# Patient Record
Sex: MALE | Race: BLACK OR AFRICAN AMERICAN | Employment: UNEMPLOYED | URBAN - METROPOLITAN AREA
[De-identification: names, ages, dates, MRNs, and addresses within clinical notes are randomized per-mention and may not be internally consistent; named-entity substitution may affect disease eponyms.]

---
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
## 2020-06-06 ENCOUNTER — HOSPITAL ENCOUNTER (INPATIENT)
Age: 20
LOS: 1 days | Discharge: HOME | DRG: 909 | End: 2020-06-07
Attending: EMERGENCY MEDICINE | Admitting: PODIATRIST

## 2020-06-06 ENCOUNTER — APPOINTMENT (OUTPATIENT)
Dept: GENERAL RADIOLOGY | Age: 20
DRG: 909 | End: 2020-06-06

## 2020-06-06 LAB
ALLEN TEST: ABNORMAL
ANION GAP SERPL CALCULATED.3IONS-SCNC: 14 MMOL/L (ref 9–17)
BLOOD BANK SPECIMEN: ABNORMAL
BUN BLDV-MCNC: 7 MG/DL (ref 6–20)
CARBOXYHEMOGLOBIN: ABNORMAL %
CHLORIDE BLD-SCNC: 101 MMOL/L (ref 98–107)
CO2: 22 MMOL/L (ref 20–31)
CREAT SERPL-MCNC: 0.82 MG/DL (ref 0.7–1.2)
ETHANOL PERCENT: 0.05 %
ETHANOL: 51 MG/DL
FIO2: ABNORMAL
GFR AFRICAN AMERICAN: ABNORMAL ML/MIN
GFR NON-AFRICAN AMERICAN: ABNORMAL ML/MIN
GFR SERPL CREATININE-BSD FRML MDRD: ABNORMAL ML/MIN/{1.73_M2}
GFR SERPL CREATININE-BSD FRML MDRD: ABNORMAL ML/MIN/{1.73_M2}
GLUCOSE BLD-MCNC: 96 MG/DL (ref 70–99)
HCG QUALITATIVE: NEGATIVE
HCO3 VENOUS: ABNORMAL MMOL/L (ref 24–30)
HCT VFR BLD CALC: 48.8 % (ref 40.7–50.3)
HEMOGLOBIN: 15.3 G/DL (ref 13–17)
INR BLD: 1
MCH RBC QN AUTO: 27.1 PG (ref 25.2–33.5)
MCHC RBC AUTO-ENTMCNC: 31.4 G/DL (ref 28.4–34.8)
MCV RBC AUTO: 86.4 FL (ref 82.6–102.9)
METHEMOGLOBIN: ABNORMAL %
MODE: ABNORMAL
NEGATIVE BASE EXCESS, VEN: ABNORMAL MMOL/L (ref 0–2)
NOTIFICATION TIME: ABNORMAL
NOTIFICATION: ABNORMAL
NRBC AUTOMATED: 0 PER 100 WBC
O2 DEVICE/FLOW/%: ABNORMAL
O2 SAT, VEN: ABNORMAL %
OXYHEMOGLOBIN: ABNORMAL % (ref 95–98)
PARTIAL THROMBOPLASTIN TIME: 26.5 SEC (ref 20.5–30.5)
PATIENT TEMP: ABNORMAL
PCO2, VEN, TEMP ADJ: ABNORMAL MMHG (ref 39–55)
PCO2, VEN: ABNORMAL (ref 39–55)
PDW BLD-RTO: 13.5 % (ref 11.8–14.4)
PEEP/CPAP: ABNORMAL
PH VENOUS: ABNORMAL (ref 7.32–7.42)
PH, VEN, TEMP ADJ: ABNORMAL (ref 7.32–7.42)
PLATELET # BLD: 263 K/UL (ref 138–453)
PMV BLD AUTO: 9.3 FL (ref 8.1–13.5)
PO2, VEN, TEMP ADJ: ABNORMAL MMHG (ref 30–50)
PO2, VEN: ABNORMAL (ref 30–50)
POSITIVE BASE EXCESS, VEN: ABNORMAL MMOL/L (ref 0–2)
POTASSIUM SERPL-SCNC: 4 MMOL/L (ref 3.7–5.3)
PROTHROMBIN TIME: 10.3 SEC (ref 9–12)
PSV: ABNORMAL
PT. POSITION: ABNORMAL
RBC # BLD: 5.65 M/UL (ref 4.21–5.77)
RESPIRATORY RATE: ABNORMAL
SAMPLE SITE: ABNORMAL
SET RATE: ABNORMAL
SODIUM BLD-SCNC: 137 MMOL/L (ref 135–144)
TEXT FOR RESPIRATORY: ABNORMAL
TOTAL HB: ABNORMAL G/DL (ref 12–16)
TOTAL RATE: ABNORMAL
VT: ABNORMAL
WBC # BLD: 9.4 K/UL (ref 4.5–13.5)

## 2020-06-06 PROCEDURE — 82805 BLOOD GASES W/O2 SATURATION: CPT

## 2020-06-06 PROCEDURE — 80051 ELECTROLYTE PANEL: CPT

## 2020-06-06 PROCEDURE — G0480 DRUG TEST DEF 1-7 CLASSES: HCPCS

## 2020-06-06 PROCEDURE — 99285 EMERGENCY DEPT VISIT HI MDM: CPT

## 2020-06-06 PROCEDURE — 73610 X-RAY EXAM OF ANKLE: CPT

## 2020-06-06 PROCEDURE — 84703 CHORIONIC GONADOTROPIN ASSAY: CPT

## 2020-06-06 PROCEDURE — 2580000003 HC RX 258: Performed by: STUDENT IN AN ORGANIZED HEALTH CARE EDUCATION/TRAINING PROGRAM

## 2020-06-06 PROCEDURE — 84520 ASSAY OF UREA NITROGEN: CPT

## 2020-06-06 PROCEDURE — 85730 THROMBOPLASTIN TIME PARTIAL: CPT

## 2020-06-06 PROCEDURE — 82947 ASSAY GLUCOSE BLOOD QUANT: CPT

## 2020-06-06 PROCEDURE — 6360000002 HC RX W HCPCS: Performed by: STUDENT IN AN ORGANIZED HEALTH CARE EDUCATION/TRAINING PROGRAM

## 2020-06-06 PROCEDURE — 82565 ASSAY OF CREATININE: CPT

## 2020-06-06 PROCEDURE — 85610 PROTHROMBIN TIME: CPT

## 2020-06-06 PROCEDURE — 85027 COMPLETE CBC AUTOMATED: CPT

## 2020-06-06 PROCEDURE — 73630 X-RAY EXAM OF FOOT: CPT

## 2020-06-06 RX ORDER — ONDANSETRON 2 MG/ML
4 INJECTION INTRAMUSCULAR; INTRAVENOUS ONCE
Status: COMPLETED | OUTPATIENT
Start: 2020-06-06 | End: 2020-06-06

## 2020-06-06 RX ORDER — MORPHINE SULFATE 4 MG/ML
4 INJECTION, SOLUTION INTRAMUSCULAR; INTRAVENOUS ONCE
Status: COMPLETED | OUTPATIENT
Start: 2020-06-06 | End: 2020-06-06

## 2020-06-06 RX ADMIN — MORPHINE SULFATE 4 MG: 4 INJECTION INTRAVENOUS at 22:09

## 2020-06-06 RX ADMIN — DEXTROSE MONOHYDRATE 2 G: 50 INJECTION, SOLUTION INTRAVENOUS at 22:54

## 2020-06-06 RX ADMIN — ONDANSETRON 4 MG: 2 INJECTION INTRAMUSCULAR; INTRAVENOUS at 22:40

## 2020-06-06 RX ADMIN — MORPHINE SULFATE 4 MG: 4 INJECTION INTRAVENOUS at 22:40

## 2020-06-06 ASSESSMENT — PAIN DESCRIPTION - PAIN TYPE: TYPE: ACUTE PAIN

## 2020-06-06 ASSESSMENT — PAIN DESCRIPTION - LOCATION: LOCATION: FOOT

## 2020-06-06 ASSESSMENT — PAIN DESCRIPTION - FREQUENCY: FREQUENCY: CONTINUOUS

## 2020-06-06 ASSESSMENT — PAIN DESCRIPTION - ORIENTATION: ORIENTATION: LEFT

## 2020-06-06 ASSESSMENT — PAIN DESCRIPTION - DESCRIPTORS: DESCRIPTORS: NUMBNESS

## 2020-06-06 ASSESSMENT — PAIN SCALES - GENERAL: PAINLEVEL_OUTOF10: 10

## 2020-06-07 ENCOUNTER — APPOINTMENT (OUTPATIENT)
Dept: CT IMAGING | Age: 20
DRG: 909 | End: 2020-06-07

## 2020-06-07 ENCOUNTER — APPOINTMENT (OUTPATIENT)
Dept: GENERAL RADIOLOGY | Age: 20
DRG: 909 | End: 2020-06-07

## 2020-06-07 ENCOUNTER — ANESTHESIA EVENT (OUTPATIENT)
Dept: OPERATING ROOM | Age: 20
DRG: 909 | End: 2020-06-07

## 2020-06-07 ENCOUNTER — ANESTHESIA (OUTPATIENT)
Dept: OPERATING ROOM | Age: 20
DRG: 909 | End: 2020-06-07

## 2020-06-07 VITALS
SYSTOLIC BLOOD PRESSURE: 102 MMHG | RESPIRATION RATE: 16 BRPM | HEART RATE: 46 BPM | WEIGHT: 160 LBS | DIASTOLIC BLOOD PRESSURE: 56 MMHG | OXYGEN SATURATION: 100 % | TEMPERATURE: 96.8 F

## 2020-06-07 VITALS — TEMPERATURE: 94.9 F | DIASTOLIC BLOOD PRESSURE: 59 MMHG | OXYGEN SATURATION: 100 % | SYSTOLIC BLOOD PRESSURE: 93 MMHG
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
## 2020-06-07 PROBLEM — S91.331A: Status: ACTIVE | Noted: 2020-06-07

## 2020-06-07 LAB
SARS-COV-2, PCR: NORMAL
SARS-COV-2, PCR: NORMAL
SARS-COV-2, RAPID: NORMAL
SARS-COV-2, RAPID: NOT DETECTED
SARS-COV-2: NORMAL
SARS-COV-2: NOT DETECTED
SOURCE: NORMAL
SOURCE: NORMAL

## 2020-06-07 PROCEDURE — 0JCR0ZZ EXTIRPATION OF MATTER FROM LEFT FOOT SUBCUTANEOUS TISSUE AND FASCIA, OPEN APPROACH: ICD-10-PCS | Performed by: PODIATRIST

## 2020-06-07 PROCEDURE — 6360000002 HC RX W HCPCS: Performed by: STUDENT IN AN ORGANIZED HEALTH CARE EDUCATION/TRAINING PROGRAM

## 2020-06-07 PROCEDURE — 73630 X-RAY EXAM OF FOOT: CPT

## 2020-06-07 PROCEDURE — 90471 IMMUNIZATION ADMIN: CPT | Performed by: STUDENT IN AN ORGANIZED HEALTH CARE EDUCATION/TRAINING PROGRAM

## 2020-06-07 PROCEDURE — 2580000003 HC RX 258: Performed by: NURSE ANESTHETIST, CERTIFIED REGISTERED

## 2020-06-07 PROCEDURE — 90715 TDAP VACCINE 7 YRS/> IM: CPT | Performed by: STUDENT IN AN ORGANIZED HEALTH CARE EDUCATION/TRAINING PROGRAM

## 2020-06-07 PROCEDURE — 73700 CT LOWER EXTREMITY W/O DYE: CPT

## 2020-06-07 PROCEDURE — 3700000000 HC ANESTHESIA ATTENDED CARE: Performed by: PODIATRIST

## 2020-06-07 PROCEDURE — 3600000004 HC SURGERY LEVEL 4 BASE: Performed by: PODIATRIST

## 2020-06-07 PROCEDURE — 28193 REMOVAL OF FOOT FOREIGN BODY: CPT | Performed by: PODIATRIST

## 2020-06-07 PROCEDURE — 2580000003 HC RX 258: Performed by: STUDENT IN AN ORGANIZED HEALTH CARE EDUCATION/TRAINING PROGRAM

## 2020-06-07 PROCEDURE — 7100000011 HC PHASE II RECOVERY - ADDTL 15 MIN: Performed by: PODIATRIST

## 2020-06-07 PROCEDURE — U0003 INFECTIOUS AGENT DETECTION BY NUCLEIC ACID (DNA OR RNA); SEVERE ACUTE RESPIRATORY SYNDROME CORONAVIRUS 2 (SARS-COV-2) (CORONAVIRUS DISEASE [COVID-19]), AMPLIFIED PROBE TECHNIQUE, MAKING USE OF HIGH THROUGHPUT TECHNOLOGIES AS DESCRIBED BY CMS-2020-01-R: HCPCS

## 2020-06-07 PROCEDURE — 3700000001 HC ADD 15 MINUTES (ANESTHESIA): Performed by: PODIATRIST

## 2020-06-07 PROCEDURE — 2500000003 HC RX 250 WO HCPCS: Performed by: NURSE ANESTHETIST, CERTIFIED REGISTERED

## 2020-06-07 PROCEDURE — 2500000003 HC RX 250 WO HCPCS: Performed by: PODIATRIST

## 2020-06-07 PROCEDURE — 73620 X-RAY EXAM OF FOOT: CPT

## 2020-06-07 PROCEDURE — 7100000010 HC PHASE II RECOVERY - FIRST 15 MIN: Performed by: PODIATRIST

## 2020-06-07 PROCEDURE — 6370000000 HC RX 637 (ALT 250 FOR IP): Performed by: STUDENT IN AN ORGANIZED HEALTH CARE EDUCATION/TRAINING PROGRAM

## 2020-06-07 PROCEDURE — 3600000014 HC SURGERY LEVEL 4 ADDTL 15MIN: Performed by: PODIATRIST

## 2020-06-07 PROCEDURE — 0J9R0ZZ DRAINAGE OF LEFT FOOT SUBCUTANEOUS TISSUE AND FASCIA, OPEN APPROACH: ICD-10-PCS | Performed by: PODIATRIST

## 2020-06-07 PROCEDURE — U0002 COVID-19 LAB TEST NON-CDC: HCPCS

## 2020-06-07 PROCEDURE — 6360000002 HC RX W HCPCS: Performed by: NURSE ANESTHETIST, CERTIFIED REGISTERED

## 2020-06-07 PROCEDURE — 2709999900 HC NON-CHARGEABLE SUPPLY: Performed by: PODIATRIST

## 2020-06-07 PROCEDURE — 1200000000 HC SEMI PRIVATE

## 2020-06-07 RX ORDER — ALBUTEROL SULFATE 90 UG/1
2 AEROSOL, METERED RESPIRATORY (INHALATION) EVERY 6 HOURS PRN
Qty: 1 INHALER | Refills: 3 | Status: CANCELLED | OUTPATIENT
Start: 2020-06-08

## 2020-06-07 RX ORDER — PROPOFOL 10 MG/ML
INJECTION, EMULSION INTRAVENOUS PRN
Status: DISCONTINUED | OUTPATIENT
Start: 2020-06-07 | End: 2020-06-07 | Stop reason: SDUPTHER

## 2020-06-07 RX ORDER — MONTELUKAST SODIUM 10 MG/1
10 TABLET ORAL NIGHTLY
Qty: 30 TABLET | Refills: 3 | Status: CANCELLED | OUTPATIENT
Start: 2020-06-07

## 2020-06-07 RX ORDER — OXYCODONE HYDROCHLORIDE AND ACETAMINOPHEN 5; 325 MG/1; MG/1
1 TABLET ORAL EVERY 6 HOURS PRN
Qty: 28 TABLET | Refills: 0 | Status: SHIPPED | OUTPATIENT
Start: 2020-06-07 | End: 2020-06-14

## 2020-06-07 RX ORDER — POLYETHYLENE GLYCOL 3350 17 G/17G
17 POWDER, FOR SOLUTION ORAL DAILY PRN
Status: DISCONTINUED | OUTPATIENT
Start: 2020-06-07 | End: 2020-06-07 | Stop reason: HOSPADM

## 2020-06-07 RX ORDER — SODIUM CHLORIDE 0.9 % (FLUSH) 0.9 %
10 SYRINGE (ML) INJECTION EVERY 12 HOURS SCHEDULED
Status: DISCONTINUED | OUTPATIENT
Start: 2020-06-07 | End: 2020-06-07 | Stop reason: HOSPADM

## 2020-06-07 RX ORDER — OXYCODONE HYDROCHLORIDE AND ACETAMINOPHEN 5; 325 MG/1; MG/1
1 TABLET ORAL EVERY 4 HOURS PRN
Status: DISCONTINUED | OUTPATIENT
Start: 2020-06-07 | End: 2020-06-07 | Stop reason: HOSPADM

## 2020-06-07 RX ORDER — CIPROFLOXACIN 500 MG/1
500 TABLET, FILM COATED ORAL 2 TIMES DAILY
Qty: 14 TABLET | Refills: 0 | Status: SHIPPED | OUTPATIENT
Start: 2020-06-07 | End: 2020-06-07 | Stop reason: SDUPTHER

## 2020-06-07 RX ORDER — SODIUM CHLORIDE 0.9 % (FLUSH) 0.9 %
10 SYRINGE (ML) INJECTION PRN
Status: DISCONTINUED | OUTPATIENT
Start: 2020-06-07 | End: 2020-06-07 | Stop reason: HOSPADM

## 2020-06-07 RX ORDER — MORPHINE SULFATE 2 MG/ML
2 INJECTION, SOLUTION INTRAMUSCULAR; INTRAVENOUS
Status: DISCONTINUED | OUTPATIENT
Start: 2020-06-07 | End: 2020-06-07 | Stop reason: HOSPADM

## 2020-06-07 RX ORDER — SODIUM CHLORIDE 0.9 % (FLUSH) 0.9 %
10 SYRINGE (ML) INJECTION PRN
Status: CANCELLED | OUTPATIENT
Start: 2020-06-07

## 2020-06-07 RX ORDER — FENTANYL CITRATE 50 UG/ML
INJECTION, SOLUTION INTRAMUSCULAR; INTRAVENOUS PRN
Status: DISCONTINUED | OUTPATIENT
Start: 2020-06-07 | End: 2020-06-07 | Stop reason: SDUPTHER

## 2020-06-07 RX ORDER — OXYCODONE HYDROCHLORIDE AND ACETAMINOPHEN 5; 325 MG/1; MG/1
2 TABLET ORAL EVERY 4 HOURS PRN
Status: DISCONTINUED | OUTPATIENT
Start: 2020-06-07 | End: 2020-06-07 | Stop reason: HOSPADM

## 2020-06-07 RX ORDER — PROMETHAZINE HYDROCHLORIDE 25 MG/1
12.5 TABLET ORAL EVERY 6 HOURS PRN
Status: DISCONTINUED | OUTPATIENT
Start: 2020-06-07 | End: 2020-06-07 | Stop reason: HOSPADM

## 2020-06-07 RX ORDER — CIPROFLOXACIN 500 MG/1
500 TABLET, FILM COATED ORAL 2 TIMES DAILY
Qty: 14 TABLET | Refills: 0 | Status: SHIPPED | OUTPATIENT
Start: 2020-06-07 | End: 2020-06-14

## 2020-06-07 RX ORDER — ACETAMINOPHEN 325 MG/1
650 TABLET ORAL EVERY 6 HOURS PRN
Status: DISCONTINUED | OUTPATIENT
Start: 2020-06-07 | End: 2020-06-07 | Stop reason: HOSPADM

## 2020-06-07 RX ORDER — POLYETHYLENE GLYCOL 3350 17 G/17G
17 POWDER, FOR SOLUTION ORAL DAILY PRN
Status: CANCELLED | OUTPATIENT
Start: 2020-06-07

## 2020-06-07 RX ORDER — ALBUTEROL SULFATE 90 UG/1
2 AEROSOL, METERED RESPIRATORY (INHALATION) EVERY 6 HOURS PRN
Status: DISCONTINUED | OUTPATIENT
Start: 2020-06-08 | End: 2020-06-07 | Stop reason: HOSPADM

## 2020-06-07 RX ORDER — ONDANSETRON 2 MG/ML
4 INJECTION INTRAMUSCULAR; INTRAVENOUS EVERY 6 HOURS PRN
Status: CANCELLED | OUTPATIENT
Start: 2020-06-07

## 2020-06-07 RX ORDER — MORPHINE SULFATE 4 MG/ML
4 INJECTION, SOLUTION INTRAMUSCULAR; INTRAVENOUS
Status: DISCONTINUED | OUTPATIENT
Start: 2020-06-07 | End: 2020-06-07 | Stop reason: HOSPADM

## 2020-06-07 RX ORDER — MONTELUKAST SODIUM 10 MG/1
10 TABLET ORAL NIGHTLY
Status: DISCONTINUED | OUTPATIENT
Start: 2020-06-07 | End: 2020-06-07 | Stop reason: HOSPADM

## 2020-06-07 RX ORDER — CIPROFLOXACIN 500 MG/1
500 TABLET, FILM COATED ORAL 2 TIMES DAILY
Qty: 14 TABLET | Refills: 0
Start: 2020-06-07 | End: 2020-06-07 | Stop reason: SDUPTHER

## 2020-06-07 RX ORDER — NICOTINE 21 MG/24HR
1 PATCH, TRANSDERMAL 24 HOURS TRANSDERMAL DAILY
Status: DISCONTINUED | OUTPATIENT
Start: 2020-06-07 | End: 2020-06-07 | Stop reason: HOSPADM

## 2020-06-07 RX ORDER — PROPOFOL 10 MG/ML
INJECTION, EMULSION INTRAVENOUS CONTINUOUS PRN
Status: DISCONTINUED | OUTPATIENT
Start: 2020-06-07 | End: 2020-06-07 | Stop reason: SDUPTHER

## 2020-06-07 RX ORDER — ACETAMINOPHEN 325 MG/1
650 TABLET ORAL EVERY 6 HOURS PRN
Status: CANCELLED | OUTPATIENT
Start: 2020-06-07

## 2020-06-07 RX ORDER — KETAMINE HYDROCHLORIDE 100 MG/ML
INJECTION, SOLUTION INTRAMUSCULAR; INTRAVENOUS PRN
Status: DISCONTINUED | OUTPATIENT
Start: 2020-06-07 | End: 2020-06-07 | Stop reason: SDUPTHER

## 2020-06-07 RX ORDER — SODIUM CHLORIDE 9 MG/ML
INJECTION, SOLUTION INTRAVENOUS CONTINUOUS PRN
Status: DISCONTINUED | OUTPATIENT
Start: 2020-06-07 | End: 2020-06-07 | Stop reason: SDUPTHER

## 2020-06-07 RX ORDER — LIDOCAINE HYDROCHLORIDE 10 MG/ML
INJECTION, SOLUTION EPIDURAL; INFILTRATION; INTRACAUDAL; PERINEURAL PRN
Status: DISCONTINUED | OUTPATIENT
Start: 2020-06-07 | End: 2020-06-07 | Stop reason: SDUPTHER

## 2020-06-07 RX ORDER — ACETAMINOPHEN 650 MG/1
650 SUPPOSITORY RECTAL EVERY 6 HOURS PRN
Status: CANCELLED | OUTPATIENT
Start: 2020-06-07

## 2020-06-07 RX ORDER — PROMETHAZINE HYDROCHLORIDE 25 MG/1
12.5 TABLET ORAL EVERY 6 HOURS PRN
Status: CANCELLED | OUTPATIENT
Start: 2020-06-07

## 2020-06-07 RX ORDER — ONDANSETRON 2 MG/ML
4 INJECTION INTRAMUSCULAR; INTRAVENOUS EVERY 6 HOURS PRN
Status: DISCONTINUED | OUTPATIENT
Start: 2020-06-07 | End: 2020-06-07 | Stop reason: HOSPADM

## 2020-06-07 RX ORDER — MIDAZOLAM HYDROCHLORIDE 1 MG/ML
INJECTION INTRAMUSCULAR; INTRAVENOUS PRN
Status: DISCONTINUED | OUTPATIENT
Start: 2020-06-07 | End: 2020-06-07 | Stop reason: SDUPTHER

## 2020-06-07 RX ORDER — ONDANSETRON 2 MG/ML
4 INJECTION INTRAMUSCULAR; INTRAVENOUS
Status: COMPLETED | OUTPATIENT
Start: 2020-06-07 | End: 2020-06-07

## 2020-06-07 RX ORDER — ACETAMINOPHEN 650 MG/1
650 SUPPOSITORY RECTAL EVERY 6 HOURS PRN
Status: DISCONTINUED | OUTPATIENT
Start: 2020-06-07 | End: 2020-06-07 | Stop reason: HOSPADM

## 2020-06-07 RX ORDER — SODIUM CHLORIDE 0.9 % (FLUSH) 0.9 %
10 SYRINGE (ML) INJECTION EVERY 12 HOURS SCHEDULED
Status: CANCELLED | OUTPATIENT
Start: 2020-06-07

## 2020-06-07 RX ORDER — MORPHINE SULFATE 4 MG/ML
4 INJECTION, SOLUTION INTRAMUSCULAR; INTRAVENOUS
Status: DISCONTINUED | OUTPATIENT
Start: 2020-06-07 | End: 2020-06-07 | Stop reason: SDUPTHER

## 2020-06-07 RX ADMIN — PIPERACILLIN AND TAZOBACTAM 3.38 G: 3; .375 INJECTION, POWDER, FOR SOLUTION INTRAVENOUS at 00:15

## 2020-06-07 RX ADMIN — LIDOCAINE HYDROCHLORIDE 50 MG: 10 INJECTION, SOLUTION EPIDURAL; INFILTRATION; INTRACAUDAL; PERINEURAL at 11:55

## 2020-06-07 RX ADMIN — ONDANSETRON 4 MG: 2 INJECTION INTRAMUSCULAR; INTRAVENOUS at 02:36

## 2020-06-07 RX ADMIN — OXYCODONE HYDROCHLORIDE AND ACETAMINOPHEN 2 TABLET: 5; 325 TABLET ORAL at 03:36

## 2020-06-07 RX ADMIN — SODIUM CHLORIDE: 9 INJECTION, SOLUTION INTRAVENOUS at 11:47

## 2020-06-07 RX ADMIN — PROPOFOL 50 MG: 10 INJECTION, EMULSION INTRAVENOUS at 11:55

## 2020-06-07 RX ADMIN — KETAMINE HYDROCHLORIDE 45 MG: 100 INJECTION, SOLUTION, CONCENTRATE INTRAMUSCULAR; INTRAVENOUS at 12:32

## 2020-06-07 RX ADMIN — TETANUS TOXOID, REDUCED DIPHTHERIA TOXOID AND ACELLULAR PERTUSSIS VACCINE, ADSORBED 0.5 ML: 5; 2.5; 8; 8; 2.5 SUSPENSION INTRAMUSCULAR at 00:15

## 2020-06-07 RX ADMIN — FENTANYL CITRATE 100 MCG: 50 INJECTION INTRAMUSCULAR; INTRAVENOUS at 11:50

## 2020-06-07 RX ADMIN — PROPOFOL 150 MCG/KG/MIN: 10 INJECTION, EMULSION INTRAVENOUS at 11:54

## 2020-06-07 RX ADMIN — PIPERACILLIN AND TAZOBACTAM 3.38 G: 3; .375 INJECTION, POWDER, FOR SOLUTION INTRAVENOUS at 11:31

## 2020-06-07 RX ADMIN — MORPHINE SULFATE 4 MG: 4 INJECTION INTRAVENOUS at 02:36

## 2020-06-07 RX ADMIN — MIDAZOLAM HYDROCHLORIDE 2 MG: 1 INJECTION, SOLUTION INTRAMUSCULAR; INTRAVENOUS at 11:50

## 2020-06-07 ASSESSMENT — PULMONARY FUNCTION TESTS
PIF_VALUE: 1
PIF_VALUE: 0
PIF_VALUE: 1
PIF_VALUE: 0
PIF_VALUE: 1

## 2020-06-07 ASSESSMENT — PAIN SCALES - GENERAL
PAINLEVEL_OUTOF10: 4
PAINLEVEL_OUTOF10: 7
PAINLEVEL_OUTOF10: 10

## 2020-06-07 ASSESSMENT — ENCOUNTER SYMPTOMS
NAUSEA: 0
CONSTIPATION: 0
DIARRHEA: 0
VOMITING: 0
SHORTNESS OF BREATH: 0
ABDOMINAL PAIN: 0
COLOR CHANGE: 1
NAUSEA: 1
VOMITING: 1

## 2020-06-07 ASSESSMENT — LIFESTYLE VARIABLES: SMOKING_STATUS: 1
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
## 2020-06-07 NOTE — ED NOTES
Antibiotics started per orders, pt crying and moaning in room. Pt remains on monitor, call light within reach.       Henrry Valdes RN  06/06/20 2245
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
## 2020-08-05 ENCOUNTER — APPOINTMENT (OUTPATIENT)
Dept: GENERAL RADIOLOGY | Age: 20
End: 2020-08-05

## 2020-08-05 ENCOUNTER — APPOINTMENT (OUTPATIENT)
Dept: CT IMAGING | Age: 20
End: 2020-08-05

## 2020-08-05 ENCOUNTER — HOSPITAL ENCOUNTER (EMERGENCY)
Age: 20
Discharge: HOME | End: 2020-08-05
Attending: EMERGENCY MEDICINE

## 2020-08-05 PROBLEM — R78.0 ELEVATED ETOH LEVEL: Status: ACTIVE | Noted: 2020-08-05

## 2020-08-05 PROBLEM — S62.201A: Status: ACTIVE | Noted: 2020-08-05

## 2020-08-05 LAB
ABO/RH: NORMAL
ALLEN TEST: ABNORMAL
ANION GAP SERPL CALCULATED.3IONS-SCNC: 19 MMOL/L (ref 9–17)
ANTIBODY SCREEN: NEGATIVE
ARM BAND NUMBER: NORMAL
BLOOD BANK SPECIMEN: ABNORMAL
BUN BLDV-MCNC: 7 MG/DL (ref 6–20)
CARBOXYHEMOGLOBIN: 3.1 % (ref 0–5)
CHLORIDE BLD-SCNC: 103 MMOL/L (ref 98–107)
CO2: 20 MMOL/L (ref 20–31)
CREAT SERPL-MCNC: 0.77 MG/DL (ref 0.7–1.2)
ETHANOL PERCENT: 0.22 %
ETHANOL: 219 MG/DL
EXPIRATION DATE: NORMAL
FIO2: ABNORMAL
GFR AFRICAN AMERICAN: ABNORMAL ML/MIN
GFR NON-AFRICAN AMERICAN: ABNORMAL ML/MIN
GFR SERPL CREATININE-BSD FRML MDRD: ABNORMAL ML/MIN/{1.73_M2}
GFR SERPL CREATININE-BSD FRML MDRD: ABNORMAL ML/MIN/{1.73_M2}
GLUCOSE BLD-MCNC: 98 MG/DL (ref 70–99)
HCG QUALITATIVE: ABNORMAL
HCO3 VENOUS: 25 MMOL/L (ref 24–30)
HCT VFR BLD CALC: 52.7 % (ref 40.7–50.3)
HEMOGLOBIN: 16.5 G/DL (ref 13–17)
INR BLD: 1
MCH RBC QN AUTO: 27.5 PG (ref 25.2–33.5)
MCHC RBC AUTO-ENTMCNC: 31.3 G/DL (ref 28.4–34.8)
MCV RBC AUTO: 87.7 FL (ref 82.6–102.9)
METHEMOGLOBIN: ABNORMAL % (ref 0–1.5)
MODE: ABNORMAL
NEGATIVE BASE EXCESS, VEN: 1 MMOL/L (ref 0–2)
NOTIFICATION TIME: ABNORMAL
NOTIFICATION: ABNORMAL
NRBC AUTOMATED: 0 PER 100 WBC
O2 DEVICE/FLOW/%: ABNORMAL
O2 SAT, VEN: 74.9 % (ref 60–85)
OXYHEMOGLOBIN: ABNORMAL % (ref 95–98)
PARTIAL THROMBOPLASTIN TIME: 25.9 SEC (ref 20.5–30.5)
PATIENT TEMP: 37
PCO2, VEN, TEMP ADJ: ABNORMAL MMHG (ref 39–55)
PCO2, VEN: 47.5 (ref 39–55)
PDW BLD-RTO: 13 % (ref 11.8–14.4)
PEEP/CPAP: ABNORMAL
PH VENOUS: 7.34 (ref 7.32–7.42)
PH, VEN, TEMP ADJ: ABNORMAL (ref 7.32–7.42)
PLATELET # BLD: 162 K/UL (ref 138–453)
PMV BLD AUTO: 9.3 FL (ref 8.1–13.5)
PO2, VEN, TEMP ADJ: ABNORMAL MMHG (ref 30–50)
PO2, VEN: 44.4 (ref 30–50)
POSITIVE BASE EXCESS, VEN: ABNORMAL MMOL/L (ref 0–2)
POTASSIUM SERPL-SCNC: 4.1 MMOL/L (ref 3.7–5.3)
PROTHROMBIN TIME: 10.2 SEC (ref 9–12)
PSV: ABNORMAL
PT. POSITION: ABNORMAL
RBC # BLD: 6.01 M/UL (ref 4.21–5.77)
RESPIRATORY RATE: ABNORMAL
SAMPLE SITE: ABNORMAL
SET RATE: ABNORMAL
SODIUM BLD-SCNC: 142 MMOL/L (ref 135–144)
TEXT FOR RESPIRATORY: ABNORMAL
TOTAL HB: ABNORMAL G/DL (ref 12–16)
TOTAL RATE: ABNORMAL
VT: ABNORMAL
WBC # BLD: 6.8 K/UL (ref 4.5–13.5)

## 2020-08-05 PROCEDURE — 6360000004 HC RX CONTRAST MEDICATION: Performed by: SURGERY

## 2020-08-05 PROCEDURE — 82565 ASSAY OF CREATININE: CPT

## 2020-08-05 PROCEDURE — 84703 CHORIONIC GONADOTROPIN ASSAY: CPT

## 2020-08-05 PROCEDURE — 74177 CT ABD & PELVIS W/CONTRAST: CPT

## 2020-08-05 PROCEDURE — 12011 RPR F/E/E/N/L/M 2.5 CM/<: CPT

## 2020-08-05 PROCEDURE — 72131 CT LUMBAR SPINE W/O DYE: CPT

## 2020-08-05 PROCEDURE — 12013 RPR F/E/E/N/L/M 2.6-5.0 CM: CPT

## 2020-08-05 PROCEDURE — 73130 X-RAY EXAM OF HAND: CPT

## 2020-08-05 PROCEDURE — 84520 ASSAY OF UREA NITROGEN: CPT

## 2020-08-05 PROCEDURE — 80051 ELECTROLYTE PANEL: CPT

## 2020-08-05 PROCEDURE — 73120 X-RAY EXAM OF HAND: CPT

## 2020-08-05 PROCEDURE — 73562 X-RAY EXAM OF KNEE 3: CPT

## 2020-08-05 PROCEDURE — 85610 PROTHROMBIN TIME: CPT

## 2020-08-05 PROCEDURE — 85730 THROMBOPLASTIN TIME PARTIAL: CPT

## 2020-08-05 PROCEDURE — 85027 COMPLETE CBC AUTOMATED: CPT

## 2020-08-05 PROCEDURE — 86850 RBC ANTIBODY SCREEN: CPT

## 2020-08-05 PROCEDURE — 2500000003 HC RX 250 WO HCPCS: Performed by: STUDENT IN AN ORGANIZED HEALTH CARE EDUCATION/TRAINING PROGRAM

## 2020-08-05 PROCEDURE — 71045 X-RAY EXAM CHEST 1 VIEW: CPT

## 2020-08-05 PROCEDURE — 86901 BLOOD TYPING SEROLOGIC RH(D): CPT

## 2020-08-05 PROCEDURE — 73030 X-RAY EXAM OF SHOULDER: CPT

## 2020-08-05 PROCEDURE — 82947 ASSAY GLUCOSE BLOOD QUANT: CPT

## 2020-08-05 PROCEDURE — 72128 CT CHEST SPINE W/O DYE: CPT

## 2020-08-05 PROCEDURE — G0480 DRUG TEST DEF 1-7 CLASSES: HCPCS

## 2020-08-05 PROCEDURE — 29125 APPL SHORT ARM SPLINT STATIC: CPT

## 2020-08-05 PROCEDURE — 99285 EMERGENCY DEPT VISIT HI MDM: CPT

## 2020-08-05 PROCEDURE — 86900 BLOOD TYPING SEROLOGIC ABO: CPT

## 2020-08-05 PROCEDURE — 72125 CT NECK SPINE W/O DYE: CPT

## 2020-08-05 PROCEDURE — 82805 BLOOD GASES W/O2 SATURATION: CPT

## 2020-08-05 PROCEDURE — 70450 CT HEAD/BRAIN W/O DYE: CPT

## 2020-08-05 RX ORDER — FENTANYL CITRATE 50 UG/ML
INJECTION, SOLUTION INTRAMUSCULAR; INTRAVENOUS
Status: DISCONTINUED
Start: 2020-08-05 | End: 2020-08-05 | Stop reason: HOSPADM

## 2020-08-05 RX ORDER — LIDOCAINE HYDROCHLORIDE 10 MG/ML
5 INJECTION, SOLUTION INFILTRATION; PERINEURAL ONCE
Status: COMPLETED | OUTPATIENT
Start: 2020-08-05 | End: 2020-08-05

## 2020-08-05 RX ADMIN — IOHEXOL 130 ML: 350 INJECTION, SOLUTION INTRAVENOUS at 06:09

## 2020-08-05 RX ADMIN — LIDOCAINE HYDROCHLORIDE 5 ML: 10 INJECTION, SOLUTION INFILTRATION; PERINEURAL at 06:39

## 2020-08-05 ASSESSMENT — ENCOUNTER SYMPTOMS
CHEST TIGHTNESS: 0
SHORTNESS OF BREATH: 0
EYE DISCHARGE: 0
EYE REDNESS: 0
ABDOMINAL PAIN: 0

## 2020-08-05 ASSESSMENT — PAIN SCALES - GENERAL: PAINLEVEL_OUTOF10: 10
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
## 2020-08-05 NOTE — PROCEDURES
PROCEDURE NOTE - LACERATION CLOSURE    PATIENT NAME: Colin CARNEY [facility] RECORD NO. [id number]  DATE: 8/5/2020  PRIMARY CARE PHYSICIAN: Nitin Patel MD    PREOPERATIVE DIAGNOSIS: Laceration(s) as follows:   LOCATION: R forehead   LENGTH: 3cm       POSTOPERATIVE DIAGNOSIS:  Same  PROCEDURE PERFORMED:  Suture closure of laceration  SURGEON: Dr. Camila Zamudio, PGY2  ANESTHESIA:  Local utilizing  Lidocaine 1% without epinephrine  ESTIMATED BLOOD LOSS:  Less than 01NB  COMPLICATIONS:  None immediately appreciated. OPERATIVE NOTE PREPARED BY: Graciela Solis DO     DISCUSSION:  Colin Michelle is a 23y.o.-year-old male. The history and physical examination were reviewed and confirmed. The diagnoses, proposed procedure, risks, possible complications, benefits and alternatives were discussed with the patient or family. Verbal informed consent was obtained. The patient was then prepared for the procedure. PROCEDURE:  A timeout was initiated and the procedure and patient were confirmed by those present. The wound area was cleansed with povidone iodine and draped in a sterile fashion. The wound area was anesthetized with Lidocaine 1% without epinephrine without added sodium bicarbonate. The wound was explored with the following results No foreign bodies found. The wound was repaired with 4-0 Prolene; 5 sutures were used. The wound was dressed and antibiotic ointment was applied. A 3-0 vicryl suture was used to place two figure of eight stitches inside the wound to stop venous oozing prior to skin closure with prolene suture. No immediate complication was evident. All sponge, instrument and needle counts were correct at the completion of the procedure.        Mimi Rosales DO
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
## 2020-08-05 NOTE — PROGRESS NOTES
Result   No acute abnormality of the cervical, thoracic or lumbar spine. XR CHEST PORTABLE   Final Result   Unremarkable single supine portable AP view of the chest.         XR KNEE RIGHT (3 VIEWS)    (Results Pending)   XR HAND RIGHT (2 VIEWS)    (Results Pending)       LABS:  Labs Reviewed   TRAUMA PANEL - Abnormal; Notable for the following components:       Result Value    Ethanol 219 (*)     Ethanol percent 0.219 (*)     RBC 6.01 (*)     Hematocrit 52.7 (*)     Anion Gap 19 (*)     All other components within normal limits   URINE DRUG SCREEN   URINALYSIS   TYPE AND SCREEN           PLAN/ TASKS OUTSTANDING     Patient will be reevaluated after sobriety and after trauma evaluation for potential discharge    (Please note that portions of this note were completed with a voice recognition program.  Efforts were made to edit the dictations but occasionally words are mis-transcribed. )    Edwards MD, F.A.C.E.P.   Attending Emergency Physician
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
## 2020-08-05 NOTE — PROGRESS NOTES
Update:    Right hand XR shows distal 1st metacarpal fracture. Consulted Dr. Peter Way MD, with hand surgery. From a trauma standpoint, ok for discharge if ok'd by hand surgery and once sober.      Electronically signed by Ayah Cameron DO on 8/5/2020 at 8:53 AM

## 2021-06-19 ENCOUNTER — APPOINTMENT (OUTPATIENT)
Dept: GENERAL RADIOLOGY | Age: 21
End: 2021-06-19

## 2021-06-19 ENCOUNTER — HOSPITAL ENCOUNTER (EMERGENCY)
Age: 21
Discharge: HOME | End: 2021-06-19
Attending: EMERGENCY MEDICINE

## 2021-06-19 VITALS
WEIGHT: 180 LBS | HEART RATE: 101 BPM | RESPIRATION RATE: 16 BRPM | DIASTOLIC BLOOD PRESSURE: 57 MMHG | OXYGEN SATURATION: 94 % | TEMPERATURE: 97.2 F | HEIGHT: 69 IN | BODY MASS INDEX: 26.66 KG/M2 | SYSTOLIC BLOOD PRESSURE: 117 MMHG

## 2021-06-19 DIAGNOSIS — S93.401A SPRAIN OF RIGHT ANKLE, UNSPECIFIED LIGAMENT, INITIAL ENCOUNTER: Primary | ICD-10-CM

## 2021-06-19 LAB
-: NORMAL
AMORPHOUS: NORMAL
BACTERIA: NORMAL
BILIRUBIN URINE: NEGATIVE
CASTS UA: NORMAL /LPF (ref 0–8)
COLOR: YELLOW
COMMENT UA: ABNORMAL
CRYSTALS, UA: NORMAL /HPF
EKG ATRIAL RATE: 81 BPM
EKG P AXIS: 76 DEGREES
EKG P-R INTERVAL: 154 MS
EKG Q-T INTERVAL: 338 MS
EKG QRS DURATION: 76 MS
EKG QTC CALCULATION (BAZETT): 392 MS
EKG R AXIS: 87 DEGREES
EKG T AXIS: 44 DEGREES
EKG VENTRICULAR RATE: 81 BPM
EPITHELIAL CELLS UA: NORMAL /HPF (ref 0–5)
GLUCOSE URINE: NEGATIVE
KETONES, URINE: NEGATIVE
LEUKOCYTE ESTERASE, URINE: ABNORMAL
MUCUS: NORMAL
NITRITE, URINE: NEGATIVE
OTHER OBSERVATIONS UA: NORMAL
PH UA: 6.5 (ref 5–8)
PROTEIN UA: NEGATIVE
RBC UA: NORMAL /HPF (ref 0–4)
RENAL EPITHELIAL, UA: NORMAL /HPF
SPECIFIC GRAVITY UA: 1.01 (ref 1–1.03)
TRICHOMONAS: NORMAL
TURBIDITY: CLEAR
URINE HGB: NEGATIVE
UROBILINOGEN, URINE: NORMAL
WBC UA: NORMAL /HPF (ref 0–5)
YEAST: NORMAL

## 2021-06-19 PROCEDURE — 73630 X-RAY EXAM OF FOOT: CPT

## 2021-06-19 PROCEDURE — 87591 N.GONORRHOEAE DNA AMP PROB: CPT

## 2021-06-19 PROCEDURE — 71045 X-RAY EXAM CHEST 1 VIEW: CPT

## 2021-06-19 PROCEDURE — 87491 CHLMYD TRACH DNA AMP PROBE: CPT

## 2021-06-19 PROCEDURE — 87661 TRICHOMONAS VAGINALIS AMPLIF: CPT

## 2021-06-19 PROCEDURE — 81001 URINALYSIS AUTO W/SCOPE: CPT

## 2021-06-19 PROCEDURE — 73610 X-RAY EXAM OF ANKLE: CPT

## 2021-06-19 PROCEDURE — 99285 EMERGENCY DEPT VISIT HI MDM: CPT

## 2021-06-19 PROCEDURE — 87086 URINE CULTURE/COLONY COUNT: CPT

## 2021-06-19 PROCEDURE — 6370000000 HC RX 637 (ALT 250 FOR IP): Performed by: STUDENT IN AN ORGANIZED HEALTH CARE EDUCATION/TRAINING PROGRAM

## 2021-06-19 PROCEDURE — 93010 ELECTROCARDIOGRAM REPORT: CPT | Performed by: INTERNAL MEDICINE

## 2021-06-19 PROCEDURE — 93005 ELECTROCARDIOGRAM TRACING: CPT | Performed by: STUDENT IN AN ORGANIZED HEALTH CARE EDUCATION/TRAINING PROGRAM

## 2021-06-19 RX ORDER — ACETAMINOPHEN 325 MG/1
650 TABLET ORAL ONCE
Status: COMPLETED | OUTPATIENT
Start: 2021-06-19 | End: 2021-06-19

## 2021-06-19 RX ORDER — ACETAMINOPHEN 325 MG/1
650 TABLET ORAL EVERY 6 HOURS PRN
Qty: 60 TABLET | Refills: 0 | Status: SHIPPED | OUTPATIENT
Start: 2021-06-19

## 2021-06-19 RX ORDER — IBUPROFEN 200 MG
400 TABLET ORAL EVERY 6 HOURS PRN
Qty: 60 TABLET | Refills: 0 | Status: SHIPPED | OUTPATIENT
Start: 2021-06-19

## 2021-06-19 RX ORDER — ALBUTEROL SULFATE 90 UG/1
1-2 AEROSOL, METERED RESPIRATORY (INHALATION) EVERY 4 HOURS PRN
Qty: 1 INHALER | Refills: 0 | Status: SHIPPED | OUTPATIENT
Start: 2021-06-19 | End: 2022-06-17 | Stop reason: SDUPTHER

## 2021-06-19 RX ORDER — IBUPROFEN 800 MG/1
800 TABLET ORAL ONCE
Status: COMPLETED | OUTPATIENT
Start: 2021-06-19 | End: 2021-06-19

## 2021-06-19 RX ADMIN — ACETAMINOPHEN 650 MG: 325 TABLET ORAL at 04:55

## 2021-06-19 RX ADMIN — IBUPROFEN 800 MG: 800 TABLET, FILM COATED ORAL at 04:55

## 2021-06-19 ASSESSMENT — ENCOUNTER SYMPTOMS
VOMITING: 0
ABDOMINAL PAIN: 0
SHORTNESS OF BREATH: 1
NAUSEA: 0

## 2021-06-19 ASSESSMENT — PAIN DESCRIPTION - LOCATION: LOCATION: ANKLE

## 2021-06-19 ASSESSMENT — PAIN SCALES - GENERAL: PAINLEVEL_OUTOF10: 8

## 2021-06-19 NOTE — ED PROVIDER NOTES
[facility] ED  Emergency Department Encounter  Emergency Medicine Resident     Pt Name: Edvin Rodriguez  MRN: [id number]  [place] 2000  Date of evaluation: 6/19/21  PCP:  Brenda Max MD    [address] [place] [address]       Chief Complaint   Patient presents with    Ankle Pain     RT ANKLE PAIN,     Other     requesting std check, denies exposure    Shortness of Breath     hx asthma       HISTORY OFPRESENT ILLNESS  (Location/Symptom, Timing/Onset, Context/Setting, Quality, Duration, Modifying Factors,Severity.)      Edvin Rodriguez is a 21  male who presents with multiple complaints. The patient reports that he was playing basketball earlier today and twisted his right ankle. Reports that this occurred around 6 PM.  He has been unable to ambulate since twisting his ankle. He did not fall to the ground or hit his head. He also had acute onset shortness of breath after twisting his ankle. Denies any chest pain. Does have a history of asthma and he is out of his inhaler. Patient is also requesting STD check. He is not having any dysuria or penile discharge. PAST MEDICAL / SURGICAL / SOCIAL / FAMILY HISTORY      has a past medical history of ADHD (attention deficit hyperactivity disorder) and Asthma. has a past surgical history that includes Foot Debridement (Left, 06/07/2020) and Foot Debridement (Left, 6/7/2020).      Social History     Socioeconomic History    Marital status: Single     Spouse name: Not on file    Number of children: Not on file    Years of education: Not on file    Highest education level: Not on file   Occupational History    Not on file   Tobacco Use    Smoking status: Passive Smoke Exposure - Never Smoker   Substance and Sexual Activity    Alcohol use: No    Drug use: No    Sexual activity: Not on file   Other Topics Concern    Not on file   Social History Narrative    Not on file     Social Determinants of Health     Financial Resource Strain:    Mp Mendez Difficulty of Paying Living Expenses:    Food Insecurity:     Worried About Running Out of Food in the Last Year:     [address] in the Last Year:    Transportation Needs:     Lack of Transportation (Medical):  Lack of Transportation (Non-Medical):    Physical Activity:     Days of Exercise per Week:     Minutes of Exercise per Session:    Stress:     Feeling of Stress :    Social Connections:     Frequency of Communication with Friends and Family:     Frequency of Social Gatherings with Friends and Family:     Attends Confucianism Services:     Active Member of Clubs or Organizations:     Attends Club or Organization Meetings:     Marital Status:    Intimate Partner Violence:     Fear of Current or Ex-Partner:     Emotionally Abused:     Physically Abused:     Sexually Abused:        No family history on file. Allergies:  Patient has no known allergies. Home Medications:  Prior to Admission medications    Medication Sig Start Date End Date Taking? Authorizing Provider   ibuprofen (ADVIL;MOTRIN) 800 MG tablet Take 1 tablet by mouth 2 times daily as needed for Pain 12/4/19   Meme Skaggs DO   ibuprofen (ADVIL;MOTRIN) 600 MG tablet Take 1 tablet by mouth every 6 hours as needed for Pain 11/5/17   Pennie Chew MD   ondansetron (ZOFRAN ODT) 4 MG disintegrating tablet Take 1 tablet by mouth every 8 hours as needed for Nausea 3/21/17   Serenity Calixto MD   albuterol sulfate HFA (PROVENTIL HFA) 108 (90 BASE) MCG/ACT inhaler Inhale 1-2 puffs into the lungs every 4 hours as needed for Wheezing or Shortness of Breath (Space out to every 6 hours as symptoms improve) Space out to every 6 hours as symptoms improve. 3/21/17   Serenity Calixto MD   montelukast (SINGULAIR) 10 MG tablet Take 1 tablet by mouth nightly 11/8/16   Arloa Babinski, MD       REVIEW OFSYSTEMS    (2-9 systems for level 4, 10 or more for level 5)      Review of Systems   Constitutional: Negative for chills and fever. Respiratory: Positive for shortness of breath. Cardiovascular: Negative for chest pain. Gastrointestinal: Negative for abdominal pain, nausea and vomiting. Musculoskeletal: Positive for arthralgias and gait problem. Neurological: Negative for light-headedness and headaches. PHYSICAL EXAM   (up to 7 for level 4, 8 or more forlevel 5)      INITIAL VITALS:   ED Triage Vitals [06/19/21 0419]   BP Temp Temp Source Pulse Resp SpO2 Height Weight   124/80 97.2 °F (36.2 °C) Oral 101 16 97 % 5' 9\" (1.753 m) 180 lb (81.6 kg)       Physical Exam  Constitutional:       General: He is not in acute distress. Appearance: He is well-developed. He is not diaphoretic. HENT:      Head: Normocephalic and atraumatic. Cardiovascular:      Rate and Rhythm: Normal rate and regular rhythm. Heart sounds: No murmur heard. No friction rub. No gallop. Pulmonary:      Effort: Pulmonary effort is normal. No respiratory distress. Breath sounds: Normal breath sounds. No wheezing or rales. Abdominal:      General: There is no distension. Palpations: Abdomen is soft. Tenderness: There is no abdominal tenderness. There is no guarding. Musculoskeletal:      Cervical back: Neck supple. Comments: RLE: TTP lateral malleolus, mild swelling of the ankle joint, normal range of motion of the ankle joint and knee joint, +2 PT and DP pulses   Skin:     General: Skin is warm. Neurological:      Mental Status: He is alert and oriented to person, place, and time.          DIFFERENTIAL  DIAGNOSIS     PLAN (LABS / IMAGING / EKG):  Orders Placed This Encounter   Procedures    C.trachomatis N.gonorrhoeae DNA, Urine    Trichomonas Vaginali, Molecular    Culture, Urine    XR CHEST PORTABLE    XR FOOT RIGHT (MIN 3 VIEWS)    XR ANKLE RIGHT (MIN 3 VIEWS)    Urinalysis Reflex to Culture    Microscopic Urinalysis    Ice to affected area    EKG 12 Lead       MEDICATIONS ORDERED:  Orders Placed This Encounter Medications    acetaminophen (TYLENOL) tablet 650 mg    ibuprofen (ADVIL;MOTRIN) tablet 800 mg         Initial MDM/Plan: 21 y.o. male who presents with right ankle/foot pain after fall. Patient had stable vital signs on arrival.  On physical exam, lungs are clear to auscultation bilaterally. Abdomen benign. Right lower extremity neurovascular intact. He did have mild swelling at the right ankle joint and tenderness palpation of the lateral malleolus. We will get x-ray imaging of the right foot/ankle. Will get chest x-ray given patient's complaint of shortness of breath and cough. DIAGNOSTIC RESULTS / EMERGENCYDEPARTMENT COURSE / MDM     LABS:  Labs Reviewed   URINE RT REFLEX TO CULTURE - Abnormal; Notable for the following components:       Result Value    Leukocyte Esterase, Urine SMALL (*)     All other components within normal limits   C.TRACHOMATIS N.GONORRHOEAE DNA, URINE   TRICHOMONAS VAGINALI, MOLECULAR   CULTURE, URINE   MICROSCOPIC URINALYSIS         RADIOLOGY:  No results found. EKG      All EKG's are interpreted by the Emergency Department Physicianwho either signs or Co-signs this chart in the absence of a cardiologist.    EMERGENCY DEPARTMENT COURSE:      Awaiting x-ray imaging. PROCEDURES:  None    CONSULTS:  None    CRITICAL CARE:  Please see attending note    FINAL IMPRESSION      1.  Sprain of right ankle, unspecified ligament, initial encounter          DISPOSITION / PLAN     DISPOSITION        PATIENT REFERRED TO:  Shanell Arvizu, [address] [place] [address]  [phone]    Schedule an appointment as soon as possible for a visit       [facility] ED  [address] [place] [address]  [phone]  Go to   If symptoms worsen      DISCHARGE MEDICATIONS:  New Prescriptions    No medications on file       Ladi Faustin MD  Emergency Medicine Resident    (Please note that portions of this note were completed with a voice recognition program.Efforts were made to edit the dictations but occasionally words are mis-transcribed.)        Katelyn Fu MD  Resident  06/19/21 8496

## 2021-06-19 NOTE — ED PROVIDER NOTES
[facility]  Emergency Department  Emergency Medicine Resident Sign-out     Care of Edvin Rodriguez was assumed from Dr. Alvin Aguilar and is being seen for Ankle Pain (RT ANKLE PAIN, ), Other (requesting std check, denies exposure), and Shortness of Breath (hx asthma)  . The patient's initial evaluation and plan have been discussed with the prior provider who initially evaluated the patient. EMERGENCY DEPARTMENT COURSE / MEDICAL DECISION MAKING:       MEDICATIONS GIVEN:  Orders Placed This Encounter   Medications    acetaminophen (TYLENOL) tablet 650 mg    ibuprofen (ADVIL;MOTRIN) tablet 800 mg    acetaminophen (TYLENOL) 325 MG tablet     Sig: Take 2 tablets by mouth every 6 hours as needed for Pain     Dispense:  60 tablet     Refill:  0    ibuprofen (ADVIL;MOTRIN) 200 MG tablet     Sig: Take 2 tablets by mouth every 6 hours as needed for Pain or Fever     Dispense:  60 tablet     Refill:  0    albuterol sulfate HFA (PROVENTIL HFA) 108 (90 Base) MCG/ACT inhaler     Sig: Inhale 1-2 puffs into the lungs every 4 hours as needed for Wheezing or Shortness of Breath (Space out to every 6 hours as symptoms improve) Space out to every 6 hours as symptoms improve. Dispense:  1 Inhaler     Refill:  0       LABS / RADIOLOGY:     Labs Reviewed   URINE RT REFLEX TO CULTURE - Abnormal; Notable for the following components:       Result Value    Leukocyte Esterase, Urine SMALL (*)     All other components within normal limits   C.TRACHOMATIS N.GONORRHOEAE DNA, URINE   TRICHOMONAS VAGINALI, MOLECULAR   CULTURE, URINE   MICROSCOPIC URINALYSIS       XR ANKLE RIGHT (MIN 3 VIEWS)    Result Date: 6/19/2021  EXAMINATION: THREE XRAY VIEWS OF THE RIGHT ANKLE 6/19/2021 5:13 am COMPARISON: None. HISTORY: ORDERING SYSTEM PROVIDED HISTORY: [facility] lateral malleolus TECHNOLOGIST PROVIDED HISTORY: TTP lateral malleolus FINDINGS: No evidence of acute fracture or dislocation. Normal alignment of the ankle mortise.   No ankle, unspecified ligament, initial encounter        DISPOSITION:         DISPOSITION:  [x]  Discharge   []  Transfer -    []  Admission -     []  Against Medical Advice   []  Eloped   FOLLOW-UP: Yanick De Jesus MD  [address] [address]  [phone]    Schedule an appointment as soon as possible for a visit       [facility] ED  [address] [place] [address]  [phone]  Go to   If symptoms worsen     DISCHARGE MEDICATIONS: Discharge Medication List as of 6/19/2021  7:15 AM      START taking these medications    Details   acetaminophen (TYLENOL) 325 MG tablet Take 2 tablets by mouth every 6 hours as needed for Pain, Disp-60 tablet, R-0Print                 Jannette Figueroa MD  Emergency Medicine Resident  [facility]       Jannette Figueroa MD  Resident  06/20/21 1498
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
## 2021-06-19 NOTE — ED NOTES
Patient resting on cot, reports he called his little brother to come pick him up, resting on cot, NAD noted.       Nicolas Jacinto, SAMIRA  06/19/21 9949

## 2021-06-20 LAB
CULTURE: NO GROWTH
Lab: NORMAL
SPECIMEN DESCRIPTION: NORMAL

## 2021-06-21 LAB
C. TRACHOMATIS DNA ,URINE: ABNORMAL
N. GONORRHOEAE DNA, URINE: ABNORMAL
SOURCE: NORMAL
SPECIMEN DESCRIPTION: ABNORMAL
TRICHOMONAS VAGINALI, MOLECULAR: NEGATIVE

## 2021-06-22 ENCOUNTER — TELEPHONE (OUTPATIENT)
Dept: PHARMACY | Age: 21
End: 2021-06-22

## 2021-06-22 NOTE — TELEPHONE ENCOUNTER
CLINICAL PHARMACY NOTE:  Telephone Follow-up for Positive STD Test    At the time of Teresita Goodman visit to [facility] Emergency Department on 6/19/2021 STD testing was performed. DNA testing was positive for Chlamydia and Gonorrhea. Unable to reach patient by phone. Sent letter to patient on 6/22/2021 regarding need to start antibiotic therapy and receive an injection to treat the infections. Advised patient to go to the Health Department, their local urgent care/ED, or return to the [facility] Emergency Department for treatment. [address] [place] [address]  Ph., CACP, Clinical Pharmacist  [facility], [address] [place] [address] [facility]  6/22/2021  10:20 AM    For Pharmacy Admin Tracking Only     Intervention Detail:    Total # of Interventions Recommended: 2   Total # of Interventions Accepted: 2   Time Spent (min): 20

## 2021-06-22 NOTE — TELEPHONE ENCOUNTER
Attempt made to reach patient by telephone to review results of STD testing. Left voice message for return call to [phone].

## 2021-08-19 ENCOUNTER — APPOINTMENT (OUTPATIENT)
Dept: GENERAL RADIOLOGY | Age: 21
DRG: 316 | End: 2021-08-19
Payer: MEDICAID

## 2021-08-19 ENCOUNTER — APPOINTMENT (OUTPATIENT)
Dept: CT IMAGING | Age: 21
DRG: 316 | End: 2021-08-19
Payer: MEDICAID

## 2021-08-19 ENCOUNTER — ANESTHESIA EVENT (OUTPATIENT)
Dept: OPERATING ROOM | Age: 21
DRG: 316 | End: 2021-08-19
Payer: MEDICAID

## 2021-08-19 ENCOUNTER — ANESTHESIA (OUTPATIENT)
Dept: OPERATING ROOM | Age: 21
DRG: 316 | End: 2021-08-19
Payer: MEDICAID

## 2021-08-19 ENCOUNTER — HOSPITAL ENCOUNTER (INPATIENT)
Age: 21
LOS: 1 days | Discharge: HOME | DRG: 316 | End: 2021-08-20
Attending: EMERGENCY MEDICINE | Admitting: SURGERY
Payer: MEDICAID

## 2021-08-19 VITALS — TEMPERATURE: 97.8 F | SYSTOLIC BLOOD PRESSURE: 110 MMHG | DIASTOLIC BLOOD PRESSURE: 80 MMHG | OXYGEN SATURATION: 95 %

## 2021-08-19 DIAGNOSIS — S61.511A LACERATION OF RIGHT WRIST, INITIAL ENCOUNTER: Primary | ICD-10-CM

## 2021-08-19 DIAGNOSIS — S41.111A: ICD-10-CM
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
## 2021-08-19 LAB
ABO/RH: NORMAL
ALLEN TEST: ABNORMAL
ANION GAP SERPL CALCULATED.3IONS-SCNC: 16 MMOL/L (ref 9–17)
ANTIBODY SCREEN: NEGATIVE
ARM BAND NUMBER: NORMAL
BLOOD BANK SPECIMEN: ABNORMAL
BUN BLDV-MCNC: 7 MG/DL (ref 6–20)
CARBOXYHEMOGLOBIN: 4.8 % (ref 0–5)
CHLORIDE BLD-SCNC: 105 MMOL/L (ref 98–107)
CO2: 20 MMOL/L (ref 20–31)
CREAT SERPL-MCNC: 0.81 MG/DL (ref 0.7–1.2)
ETHANOL PERCENT: 0.21 %
ETHANOL: 213 MG/DL
EXPIRATION DATE: NORMAL
FIO2: ABNORMAL
GFR AFRICAN AMERICAN: ABNORMAL ML/MIN
GFR NON-AFRICAN AMERICAN: ABNORMAL ML/MIN
GFR SERPL CREATININE-BSD FRML MDRD: ABNORMAL ML/MIN/{1.73_M2}
GFR SERPL CREATININE-BSD FRML MDRD: ABNORMAL ML/MIN/{1.73_M2}
GLUCOSE BLD-MCNC: 111 MG/DL (ref 70–99)
HCG QUALITATIVE: ABNORMAL
HCO3 VENOUS: 20.4 MMOL/L (ref 24–30)
HCT VFR BLD CALC: 42.3 % (ref 40.7–50.3)
HEMOGLOBIN: 13.9 G/DL (ref 13–17)
INR BLD: 1
MCH RBC QN AUTO: 27.9 PG (ref 25.2–33.5)
MCHC RBC AUTO-ENTMCNC: 32.9 G/DL (ref 28.4–34.8)
MCV RBC AUTO: 84.8 FL (ref 82.6–102.9)
METHEMOGLOBIN: ABNORMAL % (ref 0–1.5)
MODE: ABNORMAL
NEGATIVE BASE EXCESS, VEN: 1.9 MMOL/L (ref 0–2)
NOTIFICATION TIME: ABNORMAL
NOTIFICATION: ABNORMAL
NRBC AUTOMATED: 0 PER 100 WBC
O2 DEVICE/FLOW/%: ABNORMAL
O2 SAT, VEN: 99.3 % (ref 60–85)
OXYHEMOGLOBIN: ABNORMAL % (ref 95–98)
PARTIAL THROMBOPLASTIN TIME: 24.6 SEC (ref 20.5–30.5)
PATIENT TEMP: 37
PCO2, VEN, TEMP ADJ: ABNORMAL MMHG (ref 39–55)
PCO2, VEN: 29.3 (ref 39–55)
PDW BLD-RTO: 12.3 % (ref 11.8–14.4)
PEEP/CPAP: ABNORMAL
PH VENOUS: 7.46 (ref 7.32–7.42)
PH, VEN, TEMP ADJ: ABNORMAL (ref 7.32–7.42)
PLATELET # BLD: 242 K/UL (ref 138–453)
PMV BLD AUTO: 9 FL (ref 8.1–13.5)
PO2, VEN, TEMP ADJ: ABNORMAL MMHG (ref 30–50)
PO2, VEN: 142 (ref 30–50)
POSITIVE BASE EXCESS, VEN: ABNORMAL MMOL/L (ref 0–2)
POTASSIUM SERPL-SCNC: 3.8 MMOL/L (ref 3.7–5.3)
PROTHROMBIN TIME: 10.3 SEC (ref 9.1–12.3)
PSV: ABNORMAL
PT. POSITION: ABNORMAL
RBC # BLD: 4.99 M/UL (ref 4.21–5.77)
RESPIRATORY RATE: ABNORMAL
SAMPLE SITE: ABNORMAL
SARS-COV-2, RAPID: NOT DETECTED
SET RATE: ABNORMAL
SODIUM BLD-SCNC: 141 MMOL/L (ref 135–144)
SPECIMEN DESCRIPTION: NORMAL
TEXT FOR RESPIRATORY: ABNORMAL
TOTAL HB: ABNORMAL G/DL (ref 12–16)
TOTAL RATE: ABNORMAL
VT: ABNORMAL
WBC # BLD: 6.8 K/UL (ref 4.5–13.5)

## 2021-08-19 PROCEDURE — 6810039001 HC L1 TRAUMA PRIORITY

## 2021-08-19 PROCEDURE — 2709999900 HC NON-CHARGEABLE SUPPLY: Performed by: ORTHOPAEDIC SURGERY

## 2021-08-19 PROCEDURE — 6360000002 HC RX W HCPCS: Performed by: STUDENT IN AN ORGANIZED HEALTH CARE EDUCATION/TRAINING PROGRAM

## 2021-08-19 PROCEDURE — 85610 PROTHROMBIN TIME: CPT

## 2021-08-19 PROCEDURE — 80307 DRUG TEST PRSMV CHEM ANLYZR: CPT

## 2021-08-19 PROCEDURE — 2500000003 HC RX 250 WO HCPCS: Performed by: NURSE ANESTHETIST, CERTIFIED REGISTERED

## 2021-08-19 PROCEDURE — 25260 REPAIR FOREARM TENDON/MUSCLE: CPT | Performed by: ORTHOPAEDIC SURGERY

## 2021-08-19 PROCEDURE — 73110 X-RAY EXAM OF WRIST: CPT

## 2021-08-19 PROCEDURE — 86901 BLOOD TYPING SEROLOGIC RH(D): CPT

## 2021-08-19 PROCEDURE — 6360000002 HC RX W HCPCS: Performed by: ANESTHESIOLOGY

## 2021-08-19 PROCEDURE — 96374 THER/PROPH/DIAG INJ IV PUSH: CPT

## 2021-08-19 PROCEDURE — 85730 THROMBOPLASTIN TIME PARTIAL: CPT

## 2021-08-19 PROCEDURE — 85027 COMPLETE CBC AUTOMATED: CPT

## 2021-08-19 PROCEDURE — 6360000004 HC RX CONTRAST MEDICATION: Performed by: STUDENT IN AN ORGANIZED HEALTH CARE EDUCATION/TRAINING PROGRAM

## 2021-08-19 PROCEDURE — 3700000001 HC ADD 15 MINUTES (ANESTHESIA): Performed by: ORTHOPAEDIC SURGERY

## 2021-08-19 PROCEDURE — 73090 X-RAY EXAM OF FOREARM: CPT

## 2021-08-19 PROCEDURE — 92523 SPEECH SOUND LANG COMPREHEN: CPT

## 2021-08-19 PROCEDURE — 2580000003 HC RX 258: Performed by: STUDENT IN AN ORGANIZED HEALTH CARE EDUCATION/TRAINING PROGRAM

## 2021-08-19 PROCEDURE — G0480 DRUG TEST DEF 1-7 CLASSES: HCPCS

## 2021-08-19 PROCEDURE — 73130 X-RAY EXAM OF HAND: CPT

## 2021-08-19 PROCEDURE — 80051 ELECTROLYTE PANEL: CPT

## 2021-08-19 PROCEDURE — 6370000000 HC RX 637 (ALT 250 FOR IP): Performed by: STUDENT IN AN ORGANIZED HEALTH CARE EDUCATION/TRAINING PROGRAM

## 2021-08-19 PROCEDURE — 7100000000 HC PACU RECOVERY - FIRST 15 MIN: Performed by: ORTHOPAEDIC SURGERY

## 2021-08-19 PROCEDURE — 82805 BLOOD GASES W/O2 SATURATION: CPT

## 2021-08-19 PROCEDURE — 3700000000 HC ANESTHESIA ATTENDED CARE: Performed by: ORTHOPAEDIC SURGERY

## 2021-08-19 PROCEDURE — 86900 BLOOD TYPING SEROLOGIC ABO: CPT

## 2021-08-19 PROCEDURE — 72125 CT NECK SPINE W/O DYE: CPT

## 2021-08-19 PROCEDURE — 36415 COLL VENOUS BLD VENIPUNCTURE: CPT

## 2021-08-19 PROCEDURE — 3209999900 CT THORACIC SPINE TRAUMA RECONSTRUCTION

## 2021-08-19 PROCEDURE — 25040 ARTHRT RDCRPL/MIDCARPL JT: CPT | Performed by: ORTHOPAEDIC SURGERY

## 2021-08-19 PROCEDURE — 82947 ASSAY GLUCOSE BLOOD QUANT: CPT

## 2021-08-19 PROCEDURE — 84520 ASSAY OF UREA NITROGEN: CPT

## 2021-08-19 PROCEDURE — 7100000001 HC PACU RECOVERY - ADDTL 15 MIN: Performed by: ORTHOPAEDIC SURGERY

## 2021-08-19 PROCEDURE — 0RCN0ZZ EXTIRPATION OF MATTER FROM RIGHT WRIST JOINT, OPEN APPROACH: ICD-10-PCS | Performed by: ORTHOPAEDIC SURGERY

## 2021-08-19 PROCEDURE — 3600000014 HC SURGERY LEVEL 4 ADDTL 15MIN: Performed by: ORTHOPAEDIC SURGERY

## 2021-08-19 PROCEDURE — 87635 SARS-COV-2 COVID-19 AMP PRB: CPT

## 2021-08-19 PROCEDURE — 3600000004 HC SURGERY LEVEL 4 BASE: Performed by: ORTHOPAEDIC SURGERY

## 2021-08-19 PROCEDURE — 11045 DBRDMT SUBQ TISS EACH ADDL: CPT | Performed by: ORTHOPAEDIC SURGERY

## 2021-08-19 PROCEDURE — 84703 CHORIONIC GONADOTROPIN ASSAY: CPT

## 2021-08-19 PROCEDURE — 6360000002 HC RX W HCPCS: Performed by: NURSE ANESTHETIST, CERTIFIED REGISTERED

## 2021-08-19 PROCEDURE — 25270 REPAIR FOREARM TENDON/MUSCLE: CPT | Performed by: ORTHOPAEDIC SURGERY

## 2021-08-19 PROCEDURE — 0JBG0ZZ EXCISION OF RIGHT LOWER ARM SUBCUTANEOUS TISSUE AND FASCIA, OPEN APPROACH: ICD-10-PCS | Performed by: ORTHOPAEDIC SURGERY

## 2021-08-19 PROCEDURE — 2580000003 HC RX 258: Performed by: ORTHOPAEDIC SURGERY

## 2021-08-19 PROCEDURE — 71260 CT THORAX DX C+: CPT

## 2021-08-19 PROCEDURE — 70450 CT HEAD/BRAIN W/O DYE: CPT

## 2021-08-19 PROCEDURE — 05L90CZ OCCLUSION OF RIGHT BRACHIAL VEIN WITH EXTRALUMINAL DEVICE, OPEN APPROACH: ICD-10-PCS | Performed by: ORTHOPAEDIC SURGERY

## 2021-08-19 PROCEDURE — 1200000000 HC SEMI PRIVATE

## 2021-08-19 PROCEDURE — 11042 DBRDMT SUBQ TIS 1ST 20SQCM/<: CPT | Performed by: ORTHOPAEDIC SURGERY

## 2021-08-19 PROCEDURE — 99283 EMERGENCY DEPT VISIT LOW MDM: CPT

## 2021-08-19 PROCEDURE — 0LQ50ZZ REPAIR RIGHT LOWER ARM AND WRIST TENDON, OPEN APPROACH: ICD-10-PCS | Performed by: ORTHOPAEDIC SURGERY

## 2021-08-19 PROCEDURE — 3209999900 CT LUMBAR SPINE TRAUMA RECONSTRUCTION

## 2021-08-19 PROCEDURE — 86850 RBC ANTIBODY SCREEN: CPT

## 2021-08-19 PROCEDURE — 82565 ASSAY OF CREATININE: CPT

## 2021-08-19 PROCEDURE — 99253 IP/OBS CNSLTJ NEW/EST LOW 45: CPT | Performed by: ORTHOPAEDIC SURGERY

## 2021-08-19 RX ORDER — HALOPERIDOL 5 MG/ML
INJECTION INTRAMUSCULAR
Status: DISCONTINUED
Start: 2021-08-19 | End: 2021-08-19

## 2021-08-19 RX ORDER — FENTANYL CITRATE 50 UG/ML
INJECTION, SOLUTION INTRAMUSCULAR; INTRAVENOUS
Status: DISCONTINUED
Start: 2021-08-19 | End: 2021-08-19

## 2021-08-19 RX ORDER — MAGNESIUM HYDROXIDE 1200 MG/15ML
LIQUID ORAL CONTINUOUS PRN
Status: COMPLETED | OUTPATIENT
Start: 2021-08-19 | End: 2021-08-19

## 2021-08-19 RX ORDER — FENTANYL CITRATE 50 UG/ML
50 INJECTION, SOLUTION INTRAMUSCULAR; INTRAVENOUS EVERY 5 MIN PRN
Status: DISCONTINUED | OUTPATIENT
Start: 2021-08-19 | End: 2021-08-19 | Stop reason: HOSPADM

## 2021-08-19 RX ORDER — BISACODYL 10 MG
10 SUPPOSITORY, RECTAL RECTAL DAILY PRN
Status: DISCONTINUED | OUTPATIENT
Start: 2021-08-19 | End: 2021-08-19

## 2021-08-19 RX ORDER — ACETAMINOPHEN 500 MG
1000 TABLET ORAL EVERY 8 HOURS SCHEDULED
Status: DISCONTINUED | OUTPATIENT
Start: 2021-08-19 | End: 2021-08-20 | Stop reason: HOSPADM

## 2021-08-19 RX ORDER — LIDOCAINE HYDROCHLORIDE 10 MG/ML
20 INJECTION, SOLUTION INFILTRATION; PERINEURAL ONCE
Status: DISCONTINUED | OUTPATIENT
Start: 2021-08-19 | End: 2021-08-20 | Stop reason: HOSPADM

## 2021-08-19 RX ORDER — SODIUM CHLORIDE 0.9 % (FLUSH) 0.9 %
5-40 SYRINGE (ML) INJECTION EVERY 12 HOURS SCHEDULED
Status: DISCONTINUED | OUTPATIENT
Start: 2021-08-19 | End: 2021-08-20 | Stop reason: HOSPADM

## 2021-08-19 RX ORDER — GLYCOPYRROLATE 1 MG/5 ML
SYRINGE (ML) INTRAVENOUS PRN
Status: DISCONTINUED | OUTPATIENT
Start: 2021-08-19 | End: 2021-08-19 | Stop reason: SDUPTHER

## 2021-08-19 RX ORDER — DEXAMETHASONE SODIUM PHOSPHATE 10 MG/ML
INJECTION INTRAMUSCULAR; INTRAVENOUS PRN
Status: DISCONTINUED | OUTPATIENT
Start: 2021-08-19 | End: 2021-08-19 | Stop reason: SDUPTHER

## 2021-08-19 RX ORDER — ROCURONIUM BROMIDE 10 MG/ML
INJECTION, SOLUTION INTRAVENOUS PRN
Status: DISCONTINUED | OUTPATIENT
Start: 2021-08-19 | End: 2021-08-19 | Stop reason: SDUPTHER

## 2021-08-19 RX ORDER — SODIUM CHLORIDE 0.9 % (FLUSH) 0.9 %
5-40 SYRINGE (ML) INJECTION PRN
Status: DISCONTINUED | OUTPATIENT
Start: 2021-08-19 | End: 2021-08-20 | Stop reason: HOSPADM

## 2021-08-19 RX ORDER — POLYETHYLENE GLYCOL 3350 17 G/17G
17 POWDER, FOR SOLUTION ORAL DAILY
Status: DISCONTINUED | OUTPATIENT
Start: 2021-08-19 | End: 2021-08-20 | Stop reason: HOSPADM

## 2021-08-19 RX ORDER — LIDOCAINE HYDROCHLORIDE 10 MG/ML
INJECTION, SOLUTION EPIDURAL; INFILTRATION; INTRACAUDAL; PERINEURAL PRN
Status: DISCONTINUED | OUTPATIENT
Start: 2021-08-19 | End: 2021-08-19 | Stop reason: SDUPTHER

## 2021-08-19 RX ORDER — FENTANYL CITRATE 50 UG/ML
25 INJECTION, SOLUTION INTRAMUSCULAR; INTRAVENOUS EVERY 5 MIN PRN
Status: DISCONTINUED | OUTPATIENT
Start: 2021-08-19 | End: 2021-08-19 | Stop reason: HOSPADM

## 2021-08-19 RX ORDER — SODIUM CHLORIDE 9 MG/ML
25 INJECTION, SOLUTION INTRAVENOUS PRN
Status: DISCONTINUED | OUTPATIENT
Start: 2021-08-19 | End: 2021-08-20 | Stop reason: HOSPADM

## 2021-08-19 RX ORDER — MEPERIDINE HYDROCHLORIDE 50 MG/ML
12.5 INJECTION INTRAMUSCULAR; INTRAVENOUS; SUBCUTANEOUS EVERY 5 MIN PRN
Status: DISCONTINUED | OUTPATIENT
Start: 2021-08-19 | End: 2021-08-19 | Stop reason: HOSPADM

## 2021-08-19 RX ORDER — FENTANYL CITRATE 50 UG/ML
INJECTION, SOLUTION INTRAMUSCULAR; INTRAVENOUS PRN
Status: DISCONTINUED | OUTPATIENT
Start: 2021-08-19 | End: 2021-08-19 | Stop reason: SDUPTHER

## 2021-08-19 RX ORDER — ONDANSETRON 2 MG/ML
4 INJECTION INTRAMUSCULAR; INTRAVENOUS EVERY 6 HOURS PRN
Status: DISCONTINUED | OUTPATIENT
Start: 2021-08-19 | End: 2021-08-20 | Stop reason: HOSPADM

## 2021-08-19 RX ORDER — NEOSTIGMINE METHYLSULFATE 5 MG/5 ML
SYRINGE (ML) INTRAVENOUS PRN
Status: DISCONTINUED | OUTPATIENT
Start: 2021-08-19 | End: 2021-08-19 | Stop reason: SDUPTHER

## 2021-08-19 RX ORDER — IBUPROFEN 400 MG/1
400 TABLET ORAL EVERY 6 HOURS
Status: DISCONTINUED | OUTPATIENT
Start: 2021-08-19 | End: 2021-08-20 | Stop reason: HOSPADM

## 2021-08-19 RX ORDER — LIDOCAINE HYDROCHLORIDE 10 MG/ML
INJECTION, SOLUTION INFILTRATION; PERINEURAL
Status: DISCONTINUED
Start: 2021-08-19 | End: 2021-08-19

## 2021-08-19 RX ORDER — PROPOFOL 10 MG/ML
INJECTION, EMULSION INTRAVENOUS PRN
Status: DISCONTINUED | OUTPATIENT
Start: 2021-08-19 | End: 2021-08-19 | Stop reason: SDUPTHER

## 2021-08-19 RX ORDER — ONDANSETRON 2 MG/ML
4 INJECTION INTRAMUSCULAR; INTRAVENOUS ONCE
Status: COMPLETED | OUTPATIENT
Start: 2021-08-19 | End: 2021-08-19

## 2021-08-19 RX ORDER — ONDANSETRON 2 MG/ML
INJECTION INTRAMUSCULAR; INTRAVENOUS PRN
Status: DISCONTINUED | OUTPATIENT
Start: 2021-08-19 | End: 2021-08-19 | Stop reason: SDUPTHER

## 2021-08-19 RX ORDER — DEXTROSE AND SODIUM CHLORIDE 5; .45 G/100ML; G/100ML
INJECTION, SOLUTION INTRAVENOUS CONTINUOUS
Status: DISCONTINUED | OUTPATIENT
Start: 2021-08-19 | End: 2021-08-20

## 2021-08-19 RX ORDER — ONDANSETRON 4 MG/1
4 TABLET, ORALLY DISINTEGRATING ORAL EVERY 8 HOURS PRN
Status: DISCONTINUED | OUTPATIENT
Start: 2021-08-19 | End: 2021-08-20 | Stop reason: HOSPADM

## 2021-08-19 RX ADMIN — HYDROMORPHONE HYDROCHLORIDE 0.5 MG: 1 INJECTION, SOLUTION INTRAMUSCULAR; INTRAVENOUS; SUBCUTANEOUS at 19:08

## 2021-08-19 RX ADMIN — FENTANYL CITRATE 100 MCG: 50 INJECTION, SOLUTION INTRAMUSCULAR; INTRAVENOUS at 16:38

## 2021-08-19 RX ADMIN — ONDANSETRON 4 MG: 2 INJECTION, SOLUTION INTRAMUSCULAR; INTRAVENOUS at 18:03

## 2021-08-19 RX ADMIN — DEXAMETHASONE SODIUM PHOSPHATE 10 MG: 10 INJECTION INTRAMUSCULAR; INTRAVENOUS at 17:04

## 2021-08-19 RX ADMIN — DEXTROSE AND SODIUM CHLORIDE: 5; 450 INJECTION, SOLUTION INTRAVENOUS at 21:21

## 2021-08-19 RX ADMIN — ONDANSETRON 4 MG: 2 INJECTION INTRAMUSCULAR; INTRAVENOUS at 06:21

## 2021-08-19 RX ADMIN — FENTANYL CITRATE 50 MCG: 50 INJECTION, SOLUTION INTRAMUSCULAR; INTRAVENOUS at 16:47

## 2021-08-19 RX ADMIN — PROPOFOL 200 MG: 10 INJECTION, EMULSION INTRAVENOUS at 16:38

## 2021-08-19 RX ADMIN — Medication 0.6 MG: at 18:50

## 2021-08-19 RX ADMIN — FENTANYL CITRATE 50 MCG: 50 INJECTION, SOLUTION INTRAMUSCULAR; INTRAVENOUS at 19:30

## 2021-08-19 RX ADMIN — FENTANYL CITRATE 50 MCG: 50 INJECTION, SOLUTION INTRAMUSCULAR; INTRAVENOUS at 19:35

## 2021-08-19 RX ADMIN — ACETAMINOPHEN 1000 MG: 500 TABLET ORAL at 14:10

## 2021-08-19 RX ADMIN — CEFAZOLIN 2000 MG: 10 INJECTION, POWDER, FOR SOLUTION INTRAVENOUS at 16:49

## 2021-08-19 RX ADMIN — FENTANYL CITRATE 50 MCG: 50 INJECTION, SOLUTION INTRAMUSCULAR; INTRAVENOUS at 17:44

## 2021-08-19 RX ADMIN — IOPAMIDOL 130 ML: 755 INJECTION, SOLUTION INTRAVENOUS at 04:44

## 2021-08-19 RX ADMIN — SODIUM CHLORIDE, PRESERVATIVE FREE 10 ML: 5 INJECTION INTRAVENOUS at 15:02

## 2021-08-19 RX ADMIN — ACETAMINOPHEN 1000 MG: 500 TABLET ORAL at 21:21

## 2021-08-19 RX ADMIN — SODIUM CHLORIDE, PRESERVATIVE FREE 10 ML: 5 INJECTION INTRAVENOUS at 21:22

## 2021-08-19 RX ADMIN — IBUPROFEN 400 MG: 400 TABLET, FILM COATED ORAL at 21:59

## 2021-08-19 RX ADMIN — Medication 3 MG: at 18:50

## 2021-08-19 RX ADMIN — ROCURONIUM BROMIDE 50 MG: 10 INJECTION INTRAVENOUS at 16:38

## 2021-08-19 RX ADMIN — FENTANYL CITRATE 50 MCG: 50 INJECTION, SOLUTION INTRAMUSCULAR; INTRAVENOUS at 18:08

## 2021-08-19 RX ADMIN — HYDROMORPHONE HYDROCHLORIDE 0.5 MG: 1 INJECTION, SOLUTION INTRAMUSCULAR; INTRAVENOUS; SUBCUTANEOUS at 18:55

## 2021-08-19 RX ADMIN — LIDOCAINE HYDROCHLORIDE 50 MG: 10 INJECTION, SOLUTION EPIDURAL; INFILTRATION; INTRACAUDAL; PERINEURAL at 16:38

## 2021-08-19 RX ADMIN — DEXTROSE AND SODIUM CHLORIDE: 5; 450 INJECTION, SOLUTION INTRAVENOUS at 15:02

## 2021-08-19 ASSESSMENT — PAIN SCALES - GENERAL
PAINLEVEL_OUTOF10: 6
PAINLEVEL_OUTOF10: 8
PAINLEVEL_OUTOF10: 7
PAINLEVEL_OUTOF10: 7
PAINLEVEL_OUTOF10: 4
PAINLEVEL_OUTOF10: 6
PAINLEVEL_OUTOF10: 8

## 2021-08-19 ASSESSMENT — PULMONARY FUNCTION TESTS
PIF_VALUE: 21
PIF_VALUE: 20
PIF_VALUE: 27
PIF_VALUE: 19
PIF_VALUE: 21
PIF_VALUE: 21
PIF_VALUE: 18
PIF_VALUE: 20
PIF_VALUE: 0
PIF_VALUE: 26
PIF_VALUE: 22
PIF_VALUE: 0
PIF_VALUE: 19
PIF_VALUE: 21
PIF_VALUE: 22
PIF_VALUE: 13
PIF_VALUE: 21
PIF_VALUE: 22
PIF_VALUE: 22
PIF_VALUE: 20
PIF_VALUE: 24
PIF_VALUE: 20
PIF_VALUE: 21
PIF_VALUE: 20
PIF_VALUE: 21
PIF_VALUE: 21
PIF_VALUE: 20
PIF_VALUE: 19
PIF_VALUE: 9
PIF_VALUE: 21
PIF_VALUE: 19
PIF_VALUE: 32
PIF_VALUE: 22
PIF_VALUE: 25
PIF_VALUE: 28
PIF_VALUE: 21
PIF_VALUE: 18
PIF_VALUE: 18
PIF_VALUE: 19
PIF_VALUE: 19
PIF_VALUE: 22
PIF_VALUE: 1
PIF_VALUE: 19
PIF_VALUE: 24
PIF_VALUE: 24
PIF_VALUE: 2
PIF_VALUE: 25
PIF_VALUE: 20
PIF_VALUE: 19
PIF_VALUE: 22
PIF_VALUE: 25
PIF_VALUE: 24
PIF_VALUE: 18
PIF_VALUE: 19
PIF_VALUE: 21
PIF_VALUE: 22
PIF_VALUE: 22
PIF_VALUE: 20
PIF_VALUE: 21
PIF_VALUE: 22
PIF_VALUE: 24
PIF_VALUE: 21
PIF_VALUE: 22
PIF_VALUE: 19
PIF_VALUE: 19
PIF_VALUE: 26
PIF_VALUE: 20
PIF_VALUE: 19
PIF_VALUE: 27
PIF_VALUE: 25
PIF_VALUE: 21
PIF_VALUE: 25
PIF_VALUE: 19
PIF_VALUE: 23
PIF_VALUE: 21
PIF_VALUE: 20
PIF_VALUE: 0
PIF_VALUE: 22
PIF_VALUE: 21
PIF_VALUE: 19
PIF_VALUE: 19
PIF_VALUE: 20
PIF_VALUE: 21
PIF_VALUE: 21
PIF_VALUE: 23
PIF_VALUE: 22
PIF_VALUE: 3
PIF_VALUE: 19
PIF_VALUE: 18
PIF_VALUE: 20
PIF_VALUE: 18
PIF_VALUE: 20
PIF_VALUE: 22
PIF_VALUE: 25
PIF_VALUE: 21
PIF_VALUE: 26
PIF_VALUE: 24
PIF_VALUE: 19
PIF_VALUE: 0
PIF_VALUE: 18
PIF_VALUE: 28
PIF_VALUE: 19
PIF_VALUE: 19
PIF_VALUE: 25
PIF_VALUE: 22
PIF_VALUE: 19
PIF_VALUE: 22
PIF_VALUE: 24
PIF_VALUE: 20
PIF_VALUE: 15
PIF_VALUE: 19
PIF_VALUE: 22
PIF_VALUE: 21
PIF_VALUE: 25
PIF_VALUE: 23
PIF_VALUE: 20
PIF_VALUE: 19
PIF_VALUE: 19
PIF_VALUE: 20
PIF_VALUE: 19
PIF_VALUE: 21
PIF_VALUE: 21
PIF_VALUE: 5
PIF_VALUE: 19
PIF_VALUE: 23
PIF_VALUE: 19
PIF_VALUE: 20
PIF_VALUE: 19
PIF_VALUE: 20
PIF_VALUE: 21
PIF_VALUE: 19
PIF_VALUE: 21
PIF_VALUE: 25
PIF_VALUE: 23
PIF_VALUE: 21
PIF_VALUE: 25
PIF_VALUE: 19
PIF_VALUE: 22
PIF_VALUE: 22
PIF_VALUE: 19
PIF_VALUE: 20
PIF_VALUE: 19
PIF_VALUE: 26
PIF_VALUE: 23
PIF_VALUE: 20

## 2021-08-19 ASSESSMENT — PAIN DESCRIPTION - LOCATION
LOCATION: ARM
LOCATION: WRIST

## 2021-08-19 ASSESSMENT — PAIN DESCRIPTION - PAIN TYPE
TYPE: SURGICAL PAIN
TYPE: ACUTE PAIN

## 2021-08-19 ASSESSMENT — ENCOUNTER SYMPTOMS
ABDOMINAL PAIN: 0
BACK PAIN: 1
SHORTNESS OF BREATH: 0

## 2021-08-19 ASSESSMENT — PAIN DESCRIPTION - ONSET: ONSET: ON-GOING

## 2021-08-19 ASSESSMENT — PAIN DESCRIPTION - PROGRESSION: CLINICAL_PROGRESSION: NOT CHANGED

## 2021-08-19 ASSESSMENT — PAIN DESCRIPTION - DESCRIPTORS
DESCRIPTORS: ACHING
DESCRIPTORS: DISCOMFORT

## 2021-08-19 ASSESSMENT — PAIN DESCRIPTION - ORIENTATION
ORIENTATION: RIGHT
ORIENTATION: RIGHT

## 2021-08-19 ASSESSMENT — PAIN DESCRIPTION - FREQUENCY: FREQUENCY: CONTINUOUS

## 2021-08-19 ASSESSMENT — LIFESTYLE VARIABLES: SMOKING_STATUS: 1

## 2021-08-19 ASSESSMENT — PAIN - FUNCTIONAL ASSESSMENT: PAIN_FUNCTIONAL_ASSESSMENT: PREVENTS OR INTERFERES SOME ACTIVE ACTIVITIES AND ADLS

## 2021-08-19 NOTE — ED NOTES
Orthopedic surgery at the bedside updating pt and family on POC and decision for procedure in the OR, surgical consent signed by family.      Mirza Jimenez RN  08/19/21 9087

## 2021-08-19 NOTE — ED NOTES
Bed: 24  Expected date:   Expected time:   Means of arrival:   Comments:  [address] [place] [address] [place], RN  08/19/21 1152
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
## 2021-08-19 NOTE — PROGRESS NOTES
Speech Language Pathology  Facility/Department: [facility] 2C ORTHO/MED SURG  Initial Speech/Language/Cognitive Assessment    NAME: Fortino Castro. : 2000   MRN: [id number]  ADMISSION DATE: 2021  ADMITTING DIAGNOSIS: has Laceration of right arm with complication, initial encounter and Blood alcohol level of 200-239 mg/100 ml on their problem list.    Date of Eval: 2021   Evaluating Therapist: URIEL Diggs     Primary Complaint:     Pain:  Pain Assessment  Pain Assessment: 0-10  Pain Level: 0    Assessment:  Pt presents with mild-moderate cognitive deficits characterized by difficulties with immediate recall of 5 units and abstract reasoning. Pt. Irritated with evaluation. Stating he just wanted to sleep. ST provided verbal cues to attempt to keep pt. Awake and alert to complete evaluation. Unable to fully assess cognition due to irritation and lethargy. Mildly decreased intelligibility noted. ST to follow up and provide treatment to address noted deficits and complete remainder of evaluation. Education provided. Recommendations:  Requires SLP Intervention: Yes  Duration/Frequency of Treatment: 3-5 x week  D/C Recommendations: Further therapy recommended at discharge. Plan:   Goals:  Short-term Goals  Goal 1: Pt. will recall 4-5 units without distractions with 90% accuracy. Goal 2: Pt. will complete abstract reasoning tasks with 90% accuracy. Goal 3: Pt. will complete remainder of cognitive evaluation when appropriate. Patient/family involved in developing goals and treatment plan: yes    Subjective:  General  Chart Reviewed: Yes  Family / Caregiver Present: No  Social/Functional History  Lives With:  (Pt. reports he lives with his mom)  Active : No  Occupation: Unemployed  Vision  Vision: Within Functional Limits  Hearing  Hearing: Within functional limits           Objective:     Oral/Motor  Oral Motor:  Within functional limits      Expression  Primary Mode of Expression: Verbal      Motor Speech  Motor Speech: Within Functional Limits         Cognition:      Orientation  Overall Orientation Status: Within Normal Limits  Attention  Attention: Exceptions to [facility]  Sustained Attention: Mild (Pt. lethargic and occasionally required verbal cues to remain awake and alert to complete tasks)  Memory  Memory: Exceptions to [facility]  Short-term Memory: To be assessed in therapy  Immediate Memory: Mild (3/3, 0/5 increased to 5/5 with repetition, 3/3)  Problem Solving  Problem Solving: Exceptions to [facility]  Verbal Reasoning Skills: Mild (Antonyms: 2/3, Inductive Reasonin/4, S/D: NT, Word Deductions: NT)  Safety/Judgement  Safety/Judgement: Exceptions to [facility]  Insight: To be assessed in therapy  Flexibility of Thought: To be assessed in therapy   Word Associations:  WFL  Verbal Sequencing:  [facility]  Word Generation: To be assessed in therapy    Prognosis:  Speech Therapy Prognosis  Prognosis: Fair  Individuals consulted  Consulted and agree with results and recommendations: Patient    Education:  Patient Education: yes  Patient Education Response: Verbalizes understanding          Therapy Time:   Individual Concurrent Group Co-treatment   Time In 9185         Time Out 1127         Minutes 9                 Jose Ramon Euceda M.A.  CCC-SLP    2021 11:44 AM

## 2021-08-19 NOTE — PLAN OF CARE
Orthopedic Surgery Update Progress Note:    21 y/0 male s/p I&D multiple right wrist lacerations with FCU and ECU repair, ulnar vein ligation, and wrist arthrotomy I&D POD #0     - No lifting, pushing or pulling with the right arm  - Post-op abx: ancef 2g Q8H x 2 doses for standard post-op prophylaxis  - Pain control per primary  - Recommend orthopedic trauma post-op pain protocol  - Acetaminophen 1000mg q6h              - naproxen 500mg q12h              - Gabapentin 300mg TID              - Cyclobenzaprine 10mg q6h              - Oxycodone 5-10mg q4-6h prn pain   - Dressing: splint and surgical dressing to the right upper extremity.  Do not get wet, do not remove  - DVT: ok for chemical AC from Orthopedic standpoint starting POD #1 as deemed appropriate per primary team. No formal recommendation from Orthopedic standpoint given isolated upper extremity injury  - Please page Ortho with questions    Kendra Hayes DO  Orthopedic Surgery Resident, PGY-5  R [address]
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
## 2021-08-19 NOTE — ANESTHESIA PRE PROCEDURE
Department of Anesthesiology  Preprocedure Note       Name:  Tavo Giraldo.    Age:  21 y.o.  :  2000                                          MRN:  [id number]         Date:  2021      Surgeon: Brianda Liriano):  Velvet Alcala DO    Procedure: Procedure(s):  **ADD ON WANTS T.F.**  WRIST I AND D, TENDON REPAIR, NERVE REPAIR  (CYSTO TUBING, HAND TABLE)    Medications prior to admission:   Prior to Admission medications    Not on File       Current medications:    Current Facility-Administered Medications   Medication Dose Route Frequency Provider Last Rate Last Admin    lidocaine 1 % injection             Tetanus-Diphth-Acell Pertussis (BOOSTRIX) 5-2.5-18.5 LF-MCG/0.5 injection             lidocaine 1 % injection 20 mL  20 mL Intradermal Once Ian Lemon, DO        sodium chloride flush 0.9 % injection 5-40 mL  5-40 mL Intravenous 2 times per day Chayito Cortés, DO   10 mL at 21 1502    sodium chloride flush 0.9 % injection 5-40 mL  5-40 mL Intravenous PRN Chayito Cortés, DO        0.9 % sodium chloride infusion  25 mL Intravenous PRN Chayito Cortés, DO        ondansetron (ZOFRAN-ODT) disintegrating tablet 4 mg  4 mg Oral Q8H PRN Chayito Cortés, DO        Or    ondansetron [facility]) injection 4 mg  4 mg Intravenous Q6H PRN Chayito Milana, DO        polyethylene glycol (GLYCOLAX) packet 17 g  17 g Oral Daily Chayito Cortés, DO        bisacodyl (DULCOLAX) suppository 10 mg  10 mg Rectal Daily PRN Chayito Cortés, DO        acetaminophen (TYLENOL) tablet 1,000 mg  1,000 mg Oral 3 times per day Chayito Cortés, DO   1,000 mg at 21 1410    ceFAZolin (ANCEF) 2000 mg in dextrose 5 % 50 mL IVPB  2,000 mg Intravenous Q8H Ian Lemon, DO        ceFAZolin (ANCEF) 2000 mg in dextrose 5 % 50 mL IVPB  2,000 mg Intravenous On Call to [address] [place] [address], DO        dextrose 5 % and 0.45 % sodium chloride infusion   Intravenous Continuous Chayito Cortés,  mL/hr at 08/19/21 1502 New Bag at 08/19/21 1502       Allergies:  No Known Allergies    Problem List:    Patient Active Problem List   Diagnosis Code    Laceration of right arm with complication, initial encounter S41.111A    Blood alcohol level of 200-239 mg/100 ml Y90.7       Past Medical History:  History reviewed. No pertinent past medical history. Past Surgical History:  No past surgical history on file. Social History:    Social History     Tobacco Use    Smoking status: Not on file   Substance Use Topics    Alcohol use: Not on file                                Counseling given: Not Answered      Vital Signs (Current):   Vitals:    08/19/21 0608 08/19/21 0616 08/19/21 1050 08/19/21 1105   BP: (!) 120/58 99/74 (!) 132/91    Pulse: 75 96 64    Resp: 14 14 13    Temp:   98.4 °F (36.9 °C)    TempSrc:   Oral    SpO2: 96% 99% 95% 99%   Weight:    176 lb 9.4 oz (80.1 kg)   Height:    5' 9\" (1.753 m)                                              BP Readings from Last 3 Encounters:   08/19/21 (!) 132/91       NPO Status:                                                                                 BMI:   Wt Readings from Last 3 Encounters:   08/19/21 176 lb 9.4 oz (80.1 kg)     Body mass index is 26.08 kg/m². CBC:   Lab Results   Component Value Date    WBC 6.8 08/19/2021    RBC 4.99 08/19/2021    HGB 13.9 08/19/2021    HCT 42.3 08/19/2021    MCV 84.8 08/19/2021    RDW 12.3 08/19/2021     08/19/2021       CMP:   Lab Results   Component Value Date     08/19/2021    K 3.8 08/19/2021     08/19/2021    CO2 20 08/19/2021    BUN 7 08/19/2021    CREATININE 0.81 08/19/2021    GFRAA NOT REPORTED 08/19/2021    LABGLOM NOT REPORTED 08/19/2021    GLUCOSE 111 08/19/2021       POC Tests: No results for input(s): POCGLU, POCNA, POCK, POCCL, POCBUN, POCHEMO, POCHCT in the last 72 hours.     Coags:   Lab Results   Component Value Date    PROTIME 10.3 08/19/2021    INR 1.0 08/19/2021    APTT 24.6 08/19/2021 HCG (If Applicable): No results found for: PREGTESTUR, PREGSERUM, HCG, HCGQUANT     ABGs: No results found for: PHART, PO2ART, IZQ2TSA, CBU6ZIS, BEART, T4JSZHAR     Type & Screen (If Applicable):  No results found for: LABABO, LABRH    Drug/Infectious Status (If Applicable):  No results found for: HIV, HEPCAB    COVID-19 Screening (If Applicable):   Lab Results   Component Value Date    COVID19 Not Detected 08/19/2021           Anesthesia Evaluation  Patient summary reviewed and Nursing notes reviewed no history of anesthetic complications:   Airway: Mallampati: I  TM distance: >3 FB   Neck ROM: full  Mouth opening: > = 3 FB Dental: normal exam         Pulmonary:   (+) asthma: current smoker                           Cardiovascular:Negative CV ROS                      Neuro/Psych:               GI/Hepatic/Renal: Neg GI/Hepatic/Renal ROS            Endo/Other: Negative Endo/Other ROS                    Abdominal:             Vascular: Other Findings:             Anesthesia Plan      general     ASA 2       Induction: intravenous. MIPS: Postoperative opioids intended and Prophylactic antiemetics administered. Anesthetic plan and risks discussed with patient. Plan discussed with CRNA.                   Maykel Calle MD   8/19/2021
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
## 2021-08-20 VITALS
OXYGEN SATURATION: 98 % | HEIGHT: 69 IN | TEMPERATURE: 98.6 F | SYSTOLIC BLOOD PRESSURE: 132 MMHG | WEIGHT: 176.59 LBS | BODY MASS INDEX: 26.16 KG/M2 | RESPIRATION RATE: 11 BRPM | HEART RATE: 64 BPM | DIASTOLIC BLOOD PRESSURE: 69 MMHG

## 2021-08-20 PROCEDURE — 6370000000 HC RX 637 (ALT 250 FOR IP): Performed by: STUDENT IN AN ORGANIZED HEALTH CARE EDUCATION/TRAINING PROGRAM

## 2021-08-20 PROCEDURE — 97535 SELF CARE MNGMENT TRAINING: CPT

## 2021-08-20 PROCEDURE — 6360000002 HC RX W HCPCS: Performed by: STUDENT IN AN ORGANIZED HEALTH CARE EDUCATION/TRAINING PROGRAM

## 2021-08-20 PROCEDURE — 97162 PT EVAL MOD COMPLEX 30 MIN: CPT

## 2021-08-20 PROCEDURE — 97165 OT EVAL LOW COMPLEX 30 MIN: CPT

## 2021-08-20 PROCEDURE — 2580000003 HC RX 258: Performed by: STUDENT IN AN ORGANIZED HEALTH CARE EDUCATION/TRAINING PROGRAM

## 2021-08-20 PROCEDURE — 97530 THERAPEUTIC ACTIVITIES: CPT

## 2021-08-20 PROCEDURE — 97129 THER IVNTJ 1ST 15 MIN: CPT

## 2021-08-20 RX ORDER — GABAPENTIN 300 MG/1
300 CAPSULE ORAL 3 TIMES DAILY
Qty: 21 CAPSULE | Refills: 0 | Status: SHIPPED | OUTPATIENT
Start: 2021-08-20 | End: 2021-08-27

## 2021-08-20 RX ORDER — OXYCODONE HYDROCHLORIDE 5 MG/1
5 TABLET ORAL EVERY 8 HOURS PRN
Status: DISCONTINUED | OUTPATIENT
Start: 2021-08-20 | End: 2021-08-20

## 2021-08-20 RX ORDER — ACETAMINOPHEN 500 MG
1000 TABLET ORAL EVERY 8 HOURS SCHEDULED
Qty: 42 TABLET | Refills: 0 | Status: SHIPPED | OUTPATIENT
Start: 2021-08-20 | End: 2021-08-27

## 2021-08-20 RX ORDER — IBUPROFEN 400 MG/1
400 TABLET ORAL EVERY 6 HOURS
Qty: 16 TABLET | Refills: 0 | Status: SHIPPED | OUTPATIENT
Start: 2021-08-20 | End: 2021-08-24

## 2021-08-20 RX ORDER — METHOCARBAMOL 750 MG/1
750 TABLET, FILM COATED ORAL 3 TIMES DAILY
Qty: 30 TABLET | Refills: 0 | Status: SHIPPED | OUTPATIENT
Start: 2021-08-20 | End: 2021-08-30

## 2021-08-20 RX ORDER — OXYCODONE HYDROCHLORIDE 5 MG/1
5 TABLET ORAL EVERY 6 HOURS PRN
Status: DISCONTINUED | OUTPATIENT
Start: 2021-08-20 | End: 2021-08-20

## 2021-08-20 RX ORDER — GABAPENTIN 300 MG/1
300 CAPSULE ORAL 3 TIMES DAILY
Status: DISCONTINUED | OUTPATIENT
Start: 2021-08-20 | End: 2021-08-20 | Stop reason: HOSPADM

## 2021-08-20 RX ORDER — METHOCARBAMOL 750 MG/1
750 TABLET, FILM COATED ORAL 3 TIMES DAILY
Status: DISCONTINUED | OUTPATIENT
Start: 2021-08-20 | End: 2021-08-20 | Stop reason: HOSPADM

## 2021-08-20 RX ADMIN — CEFAZOLIN 2000 MG: 10 INJECTION, POWDER, FOR SOLUTION INTRAVENOUS at 10:56

## 2021-08-20 RX ADMIN — ACETAMINOPHEN 1000 MG: 500 TABLET ORAL at 06:18

## 2021-08-20 RX ADMIN — METHOCARBAMOL TABLETS 750 MG: 750 TABLET, COATED ORAL at 08:18

## 2021-08-20 RX ADMIN — IBUPROFEN 400 MG: 400 TABLET, FILM COATED ORAL at 04:24

## 2021-08-20 RX ADMIN — IBUPROFEN 400 MG: 400 TABLET, FILM COATED ORAL at 10:55

## 2021-08-20 RX ADMIN — CEFAZOLIN 2000 MG: 10 INJECTION, POWDER, FOR SOLUTION INTRAVENOUS at 01:51

## 2021-08-20 RX ADMIN — GABAPENTIN 300 MG: 300 CAPSULE ORAL at 08:18

## 2021-08-20 RX ADMIN — SODIUM CHLORIDE, PRESERVATIVE FREE 10 ML: 5 INJECTION INTRAVENOUS at 08:18

## 2021-08-20 ASSESSMENT — PAIN DESCRIPTION - ORIENTATION
ORIENTATION: RIGHT

## 2021-08-20 ASSESSMENT — PAIN DESCRIPTION - DESCRIPTORS
DESCRIPTORS: ACHING;DISCOMFORT
DESCRIPTORS: ACHING;DISCOMFORT
DESCRIPTORS: ACHING;DISCOMFORT;SORE

## 2021-08-20 ASSESSMENT — PAIN DESCRIPTION - PAIN TYPE
TYPE: ACUTE PAIN;SURGICAL PAIN
TYPE: SURGICAL PAIN
TYPE: ACUTE PAIN;SURGICAL PAIN
TYPE: ACUTE PAIN;SURGICAL PAIN

## 2021-08-20 ASSESSMENT — PAIN SCALES - GENERAL
PAINLEVEL_OUTOF10: 5
PAINLEVEL_OUTOF10: 6
PAINLEVEL_OUTOF10: 8
PAINLEVEL_OUTOF10: 6
PAINLEVEL_OUTOF10: 7
PAINLEVEL_OUTOF10: 8
PAINLEVEL_OUTOF10: 7
PAINLEVEL_OUTOF10: 8

## 2021-08-20 ASSESSMENT — PAIN - FUNCTIONAL ASSESSMENT
PAIN_FUNCTIONAL_ASSESSMENT: ACTIVITIES ARE NOT PREVENTED
PAIN_FUNCTIONAL_ASSESSMENT: ACTIVITIES ARE NOT PREVENTED

## 2021-08-20 ASSESSMENT — PAIN DESCRIPTION - LOCATION
LOCATION: ARM

## 2021-08-20 ASSESSMENT — PAIN DESCRIPTION - FREQUENCY: FREQUENCY: CONTINUOUS

## 2021-08-20 NOTE — CARE COORDINATION
Discharge [facility] Case Management Department  Written by: Rebeca Venegas RN    Patient Name: Abigail Pizarro.   Attending Provider: No att. providers found  Admit Date: 2021  3:41 AM  MRN: [id number]  Account: [de-identified]                     : 2000  Discharge Date: 2021      Disposition: home    Rebeca Venegas RN

## 2021-08-20 NOTE — PROGRESS NOTES
PROGRESS NOTE      PATIENT NAME: Do Del Rosario. MEDICAL RECORD NO. [id number]  DATE: 2021  SURGEON: Dr Malik Oconnor: No primary care provider on file. HD: # 1    ASSESSMENT    Patient Active Problem List   Diagnosis    Laceration of right arm with complication, initial encounter    Blood alcohol level of 200-239 mg/100 ml       Chief Complaint: \"hand pain\"    MEDICAL DECISION MAKING AND PLAN    1. Neuro:  1. Pain management- acetaminophen, gabapentin, ibuprofen, methocarbamol. 2. CV  1. VSS  3. Pulm  1. IS- 4000  4. GI/Nutrition  1. Diet - regular  2. Bowel reg  5. Renal/lytes  1. BMP tomorrow  6. Heme  1. CBC tomorrow  7. Endocrine  1. BS controlled  8. Musculoskeletal  1. S/p right wrist laceration with FCU & ECU repair, ulnar vein ligation, wrist arthrotomy I&D   2. Orthopedic consult:  3. Maintain splint. Do not remove. 4. No lifting, pushing, or pulling with the right arm   5. Complete post op Ancef  6. Ice (20 min, 1 hour off) and elevation for edema/pain control  7. PT/OT  8. OK for discharge from ortho standpoint   9. Skin  1. LLE laceration repaired in OR on   10. DVT  1. Lovenox  11. Dispo:  1. discharge          [address] [place] [address]. Was seen and examined at bedside today. Patient states his pain is well controlled. He denies any new or acute complaints at this time. He is tolerating diet, voiding appropriately, having BM.       OBJECTIVE  VITALS: Temp: Temp: 98.6 °F (37 °C)Temp  Av.6 °F (36.4 °C)  Min: 88.5 °F (31.4 °C)  Max: 98.8 °F (38.3 °C) BP Systolic (54CMS), WLU:238 , Min:93 , JCN:169   Diastolic (15KGA), YBX:95, Min:48, Max:107   Pulse Pulse  Av.1  Min: 64  Max: 97 Resp Resp  Av.8  Min: 0  Max: 36 Pulse ox SpO2  Av.4 %  Min: 89 %  Max: 100 %  GENERAL: alert, no distress  NEURO: CNII-XII grossly intact; moving all extremities  LUNGS: normal effort; symmetric rise and fall of chest wall  HEART: regular rate and rhythm  ABDOMEN: soft, nondistended, nontender to palpation; no guarding, rebound or rigidity  EXTREMITY: no cyanosis, clubbing or edema; splint to RUE; neurovascularly intact; dressing to LLE    I/O last 3 completed shifts: In: 1387.5 [I.V.:1387.5]  Out: 5863 [Urine:1300; Blood:50]    Drain/tube output: In: 1772.9 [I.V.:1772.9]  Out: 8578 [Urine:1300]    LAB:  CBC:   Recent Labs     08/19/21 0401   WBC 6.8   HGB 13.9   HCT 42.3   MCV 84.8        BMP:   Recent Labs     08/19/21 0401      K 3.8      CO2 20   BUN 7   CREATININE 0.81   GLUCOSE 111*     COAGS:   Recent Labs     08/19/21 0401   APTT 24.6   INR 1.0       RADIOLOGY:  CXR:   XR HAND RIGHT (MIN 3 VIEWS)   Final Result   Suboptimal evaluation for small avulsions of the interphalangeal joints due   to poor lateral positioning. No acute fracture or dislocation seen. Multiple radiopaque foreign bodies re-identified around the wrist.         XR RADIUS ULNA RIGHT (2 VIEWS)   Final Result   No acute osseous fracture is identified. Multiple radiopaque densities are seen which may represent retained glass   within the forearm and wrist.         XR WRIST RIGHT (MIN 3 VIEWS)   Final Result   No acute osseous fracture is identified. Multiple radiopaque densities are seen which may represent retained glass   within the forearm and wrist.         CT CHEST ABDOMEN PELVIS W CONTRAST   Final Result   No acute traumatic injury identified in the chest, abdomen or pelvis. The   thoracic and lumbar spine appears intact without acute abnormality. CT LUMBAR SPINE TRAUMA RECONSTRUCTION   Final Result   No acute traumatic injury identified in the chest, abdomen or pelvis. The   thoracic and lumbar spine appears intact without acute abnormality. CT THORACIC SPINE TRAUMA RECONSTRUCTION   Final Result   No acute traumatic injury identified in the chest, abdomen or pelvis.   The   thoracic and lumbar spine appears intact without acute abnormality. CT HEAD WO CONTRAST   Final Result   No acute intracranial abnormality is identified. Mildly depressed left zygomatic arch fracture, age indeterminate. No   significant superimposed soft tissue hematoma is seen to suggest definite   acute injury. CT CERVICAL SPINE WO CONTRAST   Final Result   No acute abnormality of the cervical spine.                  Demarcus Fu MD  8/20/2021, 12:57 PM

## 2021-08-20 NOTE — PROGRESS NOTES
Progress Note    Patient:  Abigail Pizarro. YOB: 2000     21 y.o. male    Subjective:  Patient seen and examined at bedside this morning. No new complaints or concerns per patient this morning. No acute issues overnight per nursing. Pain present but controlled. Noting numbness/tingling to the little finger on the right hand. Denies: fever/chills, HA, CP, SOB, N/V, dysuria. No BM/is having flatus. Has not worked with PT.    Objective:   Vitals:    08/20/21 0426   BP: (!) 142/91   Pulse:    Resp:    Temp:    SpO2:      Gen: NAD, cooperative   Cardiovascular: Regular rate  Respiratory: no audible wheezing, symmetrical chest expansion   MSK: Right upper extremity: Splint on, which is c/d/i. Exposed skin intact. Exposed digits warm and perfused w/ BCR. Unable to assess pulses secondary to splint. Able to move exposed digits without difficulty. Sensation intact to exposed digits although decreased along tip of little finger in comparison to other digits. Recent Labs     08/19/21  0401   WBC 6.8   HGB 13.9   HCT 42.3      INR 1.0      K 3.8   BUN 7   CREATININE 0.81   GLUCOSE 111*       Meds: Ancef   See rec for complete list    Impression: 21 y/0 male s/p I&D multiple right wrist lacerations with FCU and ECU repair, ulnar vein ligation, and wrist arthrotomy I&D POD #1      Plan:     -Maintain splint. Do not remove.   -No lifting, pushing, or pulling with the right arm   -Post-op Hb not yet drawn   -Complete post op Ancef  -Pain control: per primary team recommendations  -Tolerating PO intake well  -Voiding Well  -Ice (20 min, 1 hour off) and elevation for edema/pain control  -Encourage deep breathing and IS  -DVT ppx: EPC.  OK for chemical anticoagulation POD#1  -PT/OT  -OK for discharge from ortho standpoint   -Follow up with Dr. Zeke Thompson in 10-14 days   -Please page Ortho with any questions or concerns    Anton Frost DO  PGY-2 Orthopedic Surgery  5:19 AM 8/20/2021

## 2021-08-20 NOTE — ANESTHESIA POSTPROCEDURE EVALUATION
Department of Anesthesiology  Postprocedure Note    Patient: Isabelle Curry MRN: [id number]  YOB: 2000  Date of evaluation: 8/19/2021  Time:  11:03 PM     Procedure Summary     Date: 08/19/21 Room / Location: [address]    Anesthesia Start: 1634 Anesthesia Stop: 1915    Procedure: DEBRIDEMENT OF RIGHT FOREARM LACERATIONS, REPAIR OF RIGHT FCU AND ECU TENDONS, ULNAR VEIN LIGATION, RIGHT WRIST ARTHROTOMY AND DEBRIDEMENT (Right ) Diagnosis: (TENDON LACERATION)    Surgeons: Maximo Oh DO Responsible Provider: Khushbu Umanzor MD    Anesthesia Type: general ASA Status: 2          Anesthesia Type: general    Meme Phase I: Meme Score: 9    Meme Phase II:      Last vitals: Reviewed and per EMR flowsheets.        Anesthesia Post Evaluation    Patient location during evaluation: PACU  Patient participation: complete - patient participated  Level of consciousness: awake and alert  Pain score: 3  Airway patency: patent  Nausea & Vomiting: no vomiting and no nausea  Complications: no  Cardiovascular status: hemodynamically stable  Respiratory status: acceptable  Hydration status: stable

## 2021-08-20 NOTE — DISCHARGE INSTR - COC
Continuity of Care Form    Patient Name: Ramakrishna Jimenez. :  2000  MRN:  [id number]    Admit date:  2021  Discharge date:  ***    Code Status Order: Full Code   Advance Directives:     Admitting Physician:  Logan Peña MD  PCP: No primary care provider on file. Discharging Nurse: [facility] Unit/Room#: 0237/0237-01  Discharging Unit Phone Number: ***    Emergency Contact:   Extended Emergency Contact Information  Primary Emergency Contact: [address] [place] [address], [address] [place] [address] Phone: [phone]  Relation: Parent  Preferred language: English   needed? No    Past Surgical History:  Past Surgical History:   Procedure Laterality Date    DEBRIDEMENT Right 2021     DEBRIDEMENT OF RIGHT FOREARM LACERATIONS, REPAIR OF RIGHT FCU AND ECU TENDONS, ULNAR VEIN LIGATION, RIGHT WRIST ARTHROTOMY AND DEBRIDEMENT     FOREARM SURGERY Right 2021    DEBRIDEMENT OF RIGHT FOREARM LACERATIONS, REPAIR OF RIGHT FCU AND ECU TENDONS, ULNAR VEIN LIGATION, RIGHT WRIST ARTHROTOMY AND DEBRIDEMENT performed by Salomon Rinne, DO at [place] [address]       Immunization History: There is no immunization history for the selected administration types on file for this patient.     Active Problems:  Patient Active Problem List   Diagnosis Code    Laceration of right arm with complication, initial encounter S41.111A    Blood alcohol level of 200-239 mg/100 ml Y90.7       Isolation/Infection:   Isolation          No Isolation        Patient Infection Status     Infection Onset Added Last Indicated Last Indicated By Review Planned Expiration Resolved Resolved By    None active    Resolved    COVID-19 Rule Out 21 COVID-19, Rapid (Ordered)   21 Rule-Out Test Resulted          Nurse Assessment:  Last Vital Signs: /69   Pulse 64   Temp 98.6 °F (37 °C) (Oral)   Resp 11   Ht 5' 9\" (1.753 m)   Wt 176 lb 9.4 oz (80.1 kg)   SpO2 98%   BMI 26.08 kg/m²     Last documented pain score (0-10 scale): Pain Level: 6  Last Weight:   Wt Readings from Last 1 Encounters:   21 176 lb 9.4 oz (80.1 kg)     Mental Status:  {IP PT MENTAL STATUS:68343}    IV Access:  { DANIELLE IV ACCESS:366835414}    Nursing Mobility/ADLs:  Walking   {CHP DME LGZM:372913603}  Transfer  {CHP DME JEUQ:725116488}  Bathing  {CHP DME XLTY:773431420}  Dressing  {CHP DME XWWF:959318872}  Toileting  {CHP DME JINP:708030566}  Feeding  {CHP DME XFAZ:287207780}  Med Admin  {CHP DME JPTA:963441374}  Med Delivery   { DANIELLE MED Delivery:305448854}    Wound Care Documentation and Therapy:        Elimination:  Continence:   · Bowel: {YES / TL:44942}  · Bladder: {YES / ML:68697}  Urinary Catheter: {Urinary Catheter:172560592}   Colostomy/Ileostomy/Ileal Conduit: {YES / DY:57571}       Date of Last BM: ***    Intake/Output Summary (Last 24 hours) at 2021 1354  Last data filed at 2021 0732  Gross per 24 hour   Intake 1772.92 ml   Output 1350 ml   Net 422.92 ml     I/O last 3 completed shifts:   In: 1387.5 [I.V.:1387.5]  Out: 5452 [Urine:1300; Blood:50]    Safety Concerns:     [facility] Safety Concerns:498019598}    Impairments/Disabilities:      [facility] Impairments/Disabilities:558453022}    Nutrition Therapy:  Current Nutrition Therapy:   [facility] Diet List:740625737}    Routes of Feeding: {P DME Other Feedings:350661214}  Liquids: {Slp liquid thickness:10804}  Daily Fluid Restriction: {CHP DME Yes amt example:739353188}  Last Modified Barium Swallow with Video (Video Swallowing Test): {Done Not Done MJYF:310895680}    Treatments at the Time of Hospital Discharge:   Respiratory Treatments: ***  Oxygen Therapy:  {Therapy; copd oxygen:35114}  Ventilator:    { CC Vent RKIJ:084996786}    Rehab Therapies: {THERAPEUTIC INTERVENTION:8031235349}  Weight Bearing Status/Restrictions: [address] [facility] Weight Bearin}  Other Medical Equipment (for information only, NOT a DME order):  {EQUIPMENT:408269888}  Other Treatments: ***    Patient's personal belongings (please select all that are sent with patient):  {CHP DME Belongings:859907521}    RN SIGNATURE:  {Esignature:438704702}    CASE MANAGEMENT/SOCIAL WORK SECTION    Inpatient Status Date: ***    Readmission Risk Assessment Score:  Readmission Risk              Risk of Unplanned Readmission:  5           Discharging to Facility/ Agency   · Name:   · Address:  · Phone:  · Fax:    Dialysis Facility (if applicable)   · Name:  · Address:  · Dialysis Schedule:  · Phone:  · Fax:    / signature: {Esignature:355464866}    PHYSICIAN SECTION    Prognosis: {Prognosis:1830660134}    Condition at Discharge: [address] [place] [address] Patient Condition:149306765}    Rehab Potential (if transferring to Rehab): {Prognosis:9498164459}    Recommended Labs or Other Treatments After Discharge: ***    Physician Certification: I certify the above information and transfer of Melissa Weiss.  is necessary for the continuing treatment of the diagnosis listed and that he requires {Admit to Appropriate Level of Care:37746} for {GREATER/LESS:567367954} 30 days.      Update Admission H&P: {CHP DME Changes in WWGXR:464869882}    PHYSICIAN SIGNATURE:  {Esignature:117658567}

## 2021-08-20 NOTE — PROGRESS NOTES
Occupational Therapy   Occupational Therapy Initial Assessment  Date: 2021   Patient Name: Lorna Calderon MRN: [id number]     : 2000     Chief Complaint   Patient presents with    Laceration       Date of Service: 2021    Discharge Recommendations:    No therapy recommended at discharge. OT Equipment Recommendations  Equipment Needed: No    Assessment   Assessment: Pt agreeable to OT eval this date. Pt completed functional mobility with supervision for safety only. Pt demonstrates independence with all ADLs and states no concerns with independently completing upon return home. No further OT needs identified at this time  Prognosis: Good  Decision Making: Low Complexity  Patient Education: Pt ed on OT role, OT POC, safety awareness, NWB status; good return  No Skilled OT: Independent with ADL's;No OT goals identified  REQUIRES OT FOLLOW UP: No  Activity Tolerance  Activity Tolerance: Patient Tolerated treatment well  Safety Devices  Safety Devices in place: Yes  Type of devices: Nurse notified; Left in bed;Gait belt;Call light within reach  Restraints  Initially in place: No           Patient Diagnosis(es): The primary encounter diagnosis was Laceration of right wrist, initial encounter. A diagnosis of Laceration of right arm with complication, initial encounter was also pertinent to this visit. has no past medical history on file. has a past surgical history that includes debridement (Right, 2021) and Forearm surgery (Right, 2021).            Restrictions  Restrictions/Precautions  Restrictions/Precautions: Weight Bearing, Up as Tolerated  Required Braces or Orthoses?: No  Upper Extremity Weight Bearing Restrictions  Right Upper Extremity Weight Bearing: Non Weight Bearing  Position Activity Restriction  Other position/activity restrictions: GABRIEL DEWEY cleared by [address] [place], [place] [address]e [facility] ELVI Resident    Subjective   General  Patient assessed for rehabilitation services?: Yes  Family / Caregiver Present: No  General Comment  Comments: RN ok'd pt for therapy this date. Pt agreeable to session and cooperative throughout  Patient Currently in Pain: Yes  Pain Assessment  Pain Assessment: 0-10  Pain Level: 6  Pain Type: Acute pain;Surgical pain  Pain Location: Arm  Pain Orientation: Right  Pain Descriptors: Aching;Discomfort  Pain Frequency: Continuous  Functional Pain Assessment: Activities are not prevented  Non-Pharmaceutical Pain Intervention(s): Ambulation/Increased Activity; Distraction;Repositioned  Response to Pain Intervention: Patient Satisfied  Pre Treatment Pain Screening  Intervention List: Patient able to continue with treatment    Social/Functional History  Social/Functional History  Lives With: Other (comment) (mother)  Type of Home: House  Home Layout: Two level, Bed/Bath upstairs  Home Access: Stairs to enter with rails  Entrance Stairs - Number of Steps: 3  Entrance Stairs - Rails: Left  Bathroom Shower/Tub: Tub/Shower unit  Bathroom Toilet: Standard  Home Equipment:  (no DME use at baseline)  ADL Assistance: Independent  Homemaking Assistance: Independent  Homemaking Responsibilities: Yes  Ambulation Assistance: Independent  Transfer Assistance: Independent  Active : No  Patient's  Info: mother or girlfriend drives  Occupation: Unemployed  [facility]: ride bicycle, spend time with 3 y.o son       Objective   Vision: Impaired  Vision Exceptions:  (pt reports has not had glasses \"in a minute\")  Hearing: Within functional limits    Orientation  Overall Orientation Status: Within Functional Limits     Balance  Sitting Balance: Independent  Standing Balance: Independent  Functional Mobility  Functional - Mobility Device: No device  Activity: Other  Assist Level: Supervision  Functional Mobility Comments: Pt completed functional mobility within hospital room, to/from bathroom, and within hospital hallway with supervision for safety only; no LOB or unsteadiness throughout     ADL  Feeding: Independent  Grooming: Independent  UE Bathing: Independent  LE Bathing: Independent  UE Dressing: Independent  LE Dressing: Independent  Toileting: Independent (pt completed toileting independently this date)  Tone RUE  RUE Tone: Normotonic  Tone LUE  LUE Tone: Normotonic  Coordination  Movements Are Fluid And Coordinated: Yes     Bed mobility  Supine to Sit: Independent  Sit to Supine: Independent  Scooting: Independent  Transfers  Sit to stand: Independent  Stand to sit:  Independent     Cognition  Overall Cognitive Status: WFL        Sensation  Overall Sensation Status: Impaired (pt reports numbness/tingling to R hand digits 4-5)        LUE AROM (degrees)  LUE AROM : WFL  Left Hand AROM (degrees)  Left Hand AROM: WFL  RUE AROM (degrees)  RUE AROM : WFL  RUE General AROM: shoulder and elbow WFL; wrist limited d/t splint  Right Hand AROM (degrees)  Right Hand AROM: WFL  Right Hand General AROM: L digit 4-5 limited by splint; all other digits WFL  LUE Strength  Gross LUE Strength: WFL  L Hand General: 4+/5  RUE Strength  RUE Strength Comment: NT d/t NWB                   AM-PAC Score  [facility] Inpatient Daily Activity Raw Score: 24 (08/20/21 1326)  AM-PAC Inpatient ADL T-Scale Score : 57.54 (08/20/21 1326)  ADL Inpatient CMS 0-100% Score: 0 (08/20/21 1326)  ADL Inpatient CMS G-Code Modifier : [facility] (08/20/21 1326)    Therapy Time   Individual Concurrent Group Co-treatment   Time In 0911         Time Out 0928         Minutes 17         Timed Code Treatment Minutes: 8 Minutes       LIZETH Esqueda/ELISA

## 2021-08-20 NOTE — PROGRESS NOTES
CLINICAL PHARMACY NOTE: MEDS TO BEDS    Total # of Prescriptions Filled: 4   The following medications were delivered to the patient:  · Gabapentin 300 mg tabs  · Methocarbamol 750 mg tabs  · Ibuprofen 400 mg tabs  · Acetaminophen 500 mg tabs    Additional Documentation: M2B delivered as the patient was leaving.

## 2021-08-20 NOTE — CARE COORDINATION
Met with pt to complete an SBIRT. Pt was alert and oriented and very sleepy. He states that he drinks alcohol 1 - 2 times weekly. He states that he drinks 3-4 drinks in a day. He denies drug use and denies depression. SBIRT is negative. Alcohol Screening and Brief Intervention        Recent Labs     08/19/21  0401   *       Alcohol Pre-screening  (MEN ONLY) How many times in the past year have you had 5 or more drinks in a day?: 1 or more       Alcohol Screening Audit  TOTAL SCORE[de-identified] 5    Drug Pre-Screening   How many times in the past year have you used a recreational drug or used a prescription medication for nonmedical reasons?: None    Drug Screening DAST       Mood Pre-Screening (PHQ-2)  During the past two weeks, have you been bothered by little interest or pleasure in doing things?: No  During the past two weeks, have you been bothered by feeling down, depressed, or hopeless?: No    Mood Pre-Screening (PHQ-9)         I have interviewed Lizbeth David, [phone] regarding  His alcohol consumption/drug use and risk for excessive use. Screenings were negative. Patient  N/A intervention at this time.    Deferred []    Completed on: 8/20/2021   ELIANA Bernstein

## 2021-08-20 NOTE — PROGRESS NOTES
Speech Language Pathology  Speech Language Pathology  [facility]    Cognitive Treatment Note    Date: 2021  Patients Name: Robert Lovett. MRN: [id number]  Patient Active Problem List   Diagnosis Code    Laceration of right arm with complication, initial encounter S41.111A    Blood alcohol level of 200-239 mg/100 ml Y90.7       Pain: 0/10    Cognitive Treatment    Treatment time: 7134-4073      Subjective: [x] Alert [x] Cooperative     [] Confused     [] Agitated    [] Lethargic      Objective/Assessment:    Recall: Immediate recall of 5 units:  5/5   Delayed recall of 3 units:  0/3 increased to 3/3 with min-mod verbal cues            3/3 independently    Organization: Word Generation: WFL    Problem Solving/Reasoning: Antonyms: 2/3 increased to 3/3 with min verbal cues                Inductive Reasonin/4                Similarities/Differences: 3/4 increased to 4/4 with min verbal cues                Task Insight:  2/3 increased to 3/3 with min verbal cues      Thought Flexibility:  2/3 increased to 3/3 with min verbal cues      Deductive Reasonin/5 increased to 5/5 with mod-max verbal cues    Other: Goals to be added:   1. Pt. Will complete abstract and deductive reasoning tasks with 90% accuracy. Plan:  [x] Continue  services    [] Discharge from :      Discharge recommendations: [] Inpatient Rehab   [] [place]   [] Outpatient Therapy  [] Follow up at trauma clinic   [x] Other: Further therapy recommended at discharge. Treatment completed by: Ana Nava, SLP, M.A.  DU-SLP

## 2021-08-20 NOTE — PLAN OF CARE
Problem: Pain:  Description: Pain management should include both nonpharmacologic and pharmacologic interventions.   Goal: Pain level will decrease  Description: Pain level will decrease  8/20/2021 0408 by Erlin Newman RN  Outcome: Ongoing  8/20/2021 0408 by Erlin Newman RN  Outcome: Ongoing  Goal: Control of acute pain  Description: Control of acute pain  8/20/2021 0408 by Erlin Newman RN  Outcome: Ongoing  8/20/2021 0408 by Erlin Newman RN  Outcome: Ongoing  Goal: Control of chronic pain  Description: Control of chronic pain  8/20/2021 0408 by Erlin Newman RN  Outcome: Ongoing  8/20/2021 0408 by Erlin Newman RN  Outcome: Ongoing     Problem: Skin Integrity:  Goal: Will show no infection signs and symptoms  Description: Will show no infection signs and symptoms  Outcome: Ongoing  Goal: Absence of new skin breakdown  Description: Absence of new skin breakdown  Outcome: Ongoing     Problem: Falls - Risk of:  Goal: Will remain free from falls  Description: Will remain free from falls  Outcome: Ongoing  Goal: Absence of physical injury  Description: Absence of physical injury  Outcome: Ongoing

## 2021-08-20 NOTE — PROGRESS NOTES
Physical Therapy    Facility/Department: [facility] ORTHO/MED SURG  Initial Assessment    NAME: Christi Winslow. : 2000  MRN: [id number]  Chief Complaint   Patient presents with    Laceration     Date of Service: 2021    Discharge Recommendations: No further therapy required at discharge. PT Equipment Recommendations  Equipment Needed: No    Assessment   Assessment: The pt presents baseline and independent for functional mobility with no acute PT needs. Educated pt on discharge from acute PT with verbalized agreement and understanding. Prognosis: Good  Decision Making: Low Complexity  PT Education: Goals;PT Role;Plan of Care; Functional Mobility Training  REQUIRES PT FOLLOW UP: No  Activity Tolerance  Activity Tolerance: Patient Tolerated treatment well       Patient Diagnosis(es): The primary encounter diagnosis was Laceration of right wrist, initial encounter. A diagnosis of Laceration of right arm with complication, initial encounter was also pertinent to this visit. has no past medical history on file. has a past surgical history that includes debridement (Right, 2021) and Forearm surgery (Right, 2021). Restrictions  Restrictions/Precautions  Restrictions/Precautions: Weight Bearing, Up as Tolerated  Required Braces or Orthoses?: No  Upper Extremity Weight Bearing Restrictions  Right Upper Extremity Weight Bearing: Non Weight Bearing  Position Activity Restriction  Other position/activity restrictions: NWB RUE  Vision/Hearing  Vision: Impaired  Vision Exceptions:  (pt reports has not had glasses \"in a minute\")  Hearing: Within functional limits     Subjective  General  Patient assessed for rehabilitation services?: Yes  Response To Previous Treatment: Not applicable  Family / Caregiver Present: No  Follows Commands: Within Functional Limits  Subjective  Subjective: RN and pt agreeable to PT. Pt supine in bed upon arrival, pleasant and cooperative thorughout.   Pain Screening  Patient Currently in Pain: Yes  Pain Assessment  Pain Assessment: 0-10  Pain Level: 8  Pain Type: Acute pain;Surgical pain  Pain Location: Arm  Pain Orientation: Right  Pain Descriptors: Aching;Discomfort; Sore  Pain Frequency: Continuous  Non-Pharmaceutical Pain Intervention(s): Ambulation/Increased Activity  Response to Pain Intervention: Patient Satisfied  Vital Signs  Patient Currently in Pain: Yes       Orientation  Orientation  Overall Orientation Status: Within Functional Limits  Social/Functional History  Social/Functional History  Lives With: Other (comment) (mother)  Type of Home: House  Home Layout: Two level, Bed/Bath upstairs  Home Access: Stairs to enter with rails  Entrance Stairs - Number of Steps: 3  Entrance Stairs - Rails: Left  Bathroom Shower/Tub: Tub/Shower unit  Bathroom Toilet: Standard  Home Equipment:  (no DME use at baseline)  ADL Assistance: [address] [place] [address] Avenue: Independent  Homemaking Responsibilities: Yes  Ambulation Assistance: Independent  Transfer Assistance: Independent  Active : No  Patient's  Info: mother or girlfriend drives  Occupation: Unemployed  Leisure & Hobbies: ride bicycle, spend time with 3 y.o son  Cognition   Cognition  Overall Cognitive Status: WFL    Objective          Joint Mobility  Spine: WFL  ROM RLE: WFL  ROM LLE: WFL  ROM RUE: WFL- elbow/hand not assessed due to splinting  ROM LUE: WFL  Strength RLE  Strength RLE: WFL  Strength LLE  Strength LLE: WFL  Strength RUE  Comment: elbow and distal not assessed due to NWB, shoulder WFL  Strength LUE  Strength LUE: WFL  Tone RLE  RLE Tone: Normotonic  Tone LLE  LLE Tone: Normotonic  Motor Control  Gross Motor?: WFL  Sensation  Overall Sensation Status: Impaired (pt reports numbness/tingling to R hand digits 4-5)  Bed mobility  Supine to Sit: Independent  Sit to Supine: Independent  Scooting: Independent  Transfers  Sit to Stand: Independent  Stand to sit:  Independent  Stand Pivot Transfers: Independent  Ambulation  Ambulation?: Yes  Ambulation 1  Surface: level tile  Device: No Device  Assistance: Independent  Quality of Gait: steady, appropriate simeon, no LOB  Gait Deviations: None  Stairs/Curb  Stairs?: Yes  Stairs  # Steps : 10  Stairs Height: 6\"  Rails: Left ascending  Device: No Device  Assistance: Independent  Comment: reciprocal, steady, no LOB     Balance  Posture: Good  Sitting - Static: Good  Sitting - Dynamic: Good  Standing - Static: Good  Standing - Dynamic: Good  Comments: standing balance assessed without AD        Plan   Plan  Times per week: d/c PT  Safety Devices  Type of devices: Nurse notified, Call light within reach, Gait belt, Left in bed  Restraints  Initially in place: No    AM-PAC Score  AM-PAC Inpatient Mobility Raw Score : 24 (08/20/21 1223)  AM-PAC Inpatient T-Scale Score : 61.14 (08/20/21 1223)  Mobility Inpatient CMS 0-100% Score: 0 (08/20/21 1223)  Mobility Inpatient CMS G-Code Modifier : [address] (08/20/21 [facility]3)      Therapy Time   Individual Concurrent Group Co-treatment   Time In 0911         Time Out 0928         Minutes 17         Timed Code Treatment Minutes: 8 Minutes       Angela Claire, PT

## 2021-08-20 NOTE — CARE COORDINATION
Case Management Initial Discharge Plan  Nilo Carranza,             Met with:patient to discuss discharge plans. Information verified: address, contacts, phone number, , insurance Yes  Insurance Provider: none    Emergency Contact/Next of Kin name & number:   Joann Quintana (mother) pt states he does not know her new phone number  Who are involved in patient's support system? Girlfriend, brother    PCP: No primary care provider on file. Date of last visit: unknown      Discharge Planning    Living Arrangements:  Family Members     Home has 2 stories  3 stairs to climb to get into front door, 13stairs to climb to reach second floor  Location of bedroom/bathroom in home upstairs bedroom, pt states he is able to climb stairs with no difficulties    Patient able to perform ADL's:Independent    Current Services (outpatient & in home) none  DME equipment: none  DME provider:     Is patient receiving oral anticoagulation therapy? No        Potential Assistance Needed:  N/A    Patient agreeable to home care: No  [place] of choice provided:  n/a    Prior SNF/Rehab Placement and Facility: no  Agreeable to SNF/Rehab: No  [place] of choice provided: n/a     Evaluation: no    Expected Discharge date:  21    Patient expects to be discharged to: If home: is the family and/or caregiver wiling & able to provide support at home? yes  Who will be providing this support? Girlfriend/brother    Follow Up Appointment: Best Day/ Time: Monday AM    Transportation provider: little  Transportation arrangements needed for discharge: No    Readmission Risk              Risk of Unplanned Readmission:  4             Does patient have a readmission risk score greater than 14?: No  If yes, follow-up appointment must be made within 7 days of discharge.      Goals of Care: pain control, proper healing      Educated patient on transitional options, provided freedom of choice and are agreeable with plan      Discharge Plan: home today independently, no needs expressed or assessed by writer.  Will have transport from girlfriend           Electronically signed by Eduardo Priest RN on 8/20/21 at 9:43 AM EDT

## 2021-08-20 NOTE — PROGRESS NOTES
POST OP NOTE    SUBJECTIVE  Pt s/p debridement of right forearm lacerations, repair of right FCU and ECU tendons, ulnar vein ligation, right wrist arthrotomy and debridement. OBJECTIVE  VITALS:  BP (!) 153/97   Pulse 97   Temp 98.8 °F (37.1 °C) (Oral)   Resp 12   Ht 5' 9\" (1.753 m)   Wt 176 lb 9.4 oz (80.1 kg)   SpO2 97%   BMI 26.08 kg/m²         GENERAL:  awake, but tired and somnolent. No acute distress  CARDIOVASCULAR:  regular rate and rhythm, distal pulses intact   LUNGS:  Symmetric chest rise and fall, CTA Bilaterally  ABDOMEN:   Abdomen soft, non-tender, non-distended  INCISION: Incision clean/dry/intact, ACE wrap in place without drainage, neurovascularly intact distally     ASSESSMENT  1. POD# 0 s/p debridement and repair of right forearm lacerations, right FCU and ECU tendons. PLAN  1. Pain management-   - Acetaminophen 1000mg q8   - Ibuprofen 400mg q6  2. DVT proph- ok to resume POD#1 if needed  3. GI proph- glycolax daily  4.  Weight bearing status- No lifting, pushing or pulling with right arm      Micah Theodore MD  Trauma/Surgery Service  8/19/2021 at 8:38 PM
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
## 2021-09-10 ENCOUNTER — HOSPITAL ENCOUNTER (EMERGENCY)
Age: 21
Discharge: HOME | End: 2021-09-10
Attending: EMERGENCY MEDICINE
Payer: COMMERCIAL

## 2021-09-10 VITALS
TEMPERATURE: 98.6 F | WEIGHT: 180.3 LBS | HEART RATE: 79 BPM | HEIGHT: 70 IN | SYSTOLIC BLOOD PRESSURE: 136 MMHG | OXYGEN SATURATION: 97 % | BODY MASS INDEX: 25.81 KG/M2 | RESPIRATION RATE: 16 BRPM | DIASTOLIC BLOOD PRESSURE: 79 MMHG

## 2021-09-10 DIAGNOSIS — Z48.02 VISIT FOR SUTURE REMOVAL: Primary | ICD-10-CM

## 2021-09-10 PROCEDURE — 99282 EMERGENCY DEPT VISIT SF MDM: CPT

## 2021-09-10 ASSESSMENT — ENCOUNTER SYMPTOMS
ABDOMINAL PAIN: 0
ABDOMINAL DISTENTION: 0
SHORTNESS OF BREATH: 0
EYE DISCHARGE: 0
BACK PAIN: 0
FACIAL SWELLING: 0
CHEST TIGHTNESS: 0
EYE PAIN: 0

## 2021-09-10 NOTE — ED NOTES
Pt presents to ED for suture removal. Sutures placed approximately 3 weeks ago in right wrist/forearm.       Hassan Rinne, RN  09/10/21 [id number]

## 2021-09-10 NOTE — ED PROVIDER NOTES
[facility]    Pt Name: Shamika Kasper. MRN: [id number]  Birthdate 2000  Date of evaluation: 9/10/21  CHIEF COMPLAINT       Chief Complaint   Patient presents with    Suture / Staple Removal     right hand, placed 2.5 weeks ago     [address] [place] [address]   The patient is a 51-year-old male presented to the emergency department secondary to suture removal.  Patient stated 3 weeks ago he sustained a laceration to his right forearm requiring surgical repair of tendon at [facility]. [facility]. He stated he waited 3 weeks to ensure that the wound was healing properly. Denied any pain purulent drainage. Patient denied chest pain, shortness of breath, nausea, vomiting, fevers or chills    REVIEW OF SYSTEMS     Review of Systems   Constitutional: Negative for chills, diaphoresis and fever. HENT: Negative for congestion, ear pain and facial swelling. Eyes: Negative for pain, discharge and visual disturbance. Respiratory: Negative for chest tightness and shortness of breath. Cardiovascular: Negative for chest pain and palpitations. Gastrointestinal: Negative for abdominal distention and abdominal pain. Genitourinary: Negative for difficulty urinating and flank pain. Musculoskeletal: Negative for back pain. Skin: Negative for wound. Neurological: Negative for dizziness, light-headedness and headaches.      PASTMEDICAL HISTORY     Past Medical History:   Diagnosis Date    ADHD (attention deficit hyperactivity disorder)     Asthma      SURGICAL HISTORY       Past Surgical History:   Procedure Laterality Date    DEBRIDEMENT Right 08/19/2021     DEBRIDEMENT OF RIGHT FOREARM LACERATIONS, REPAIR OF RIGHT FCU AND ECU TENDONS, ULNAR VEIN LIGATION, RIGHT WRIST ARTHROTOMY AND DEBRIDEMENT     FOOT DEBRIDEMENT Left 06/07/2020    foreign body removal    FOOT DEBRIDEMENT Left 6/7/2020    FOOT DEBRIDEMENT INCISION AND DRAINAGE WITH FOREIGN BODY REMOVAL performed by Ayla Rodriguez DPM at [facility] Right 8/19/2021    DEBRIDEMENT OF RIGHT FOREARM LACERATIONS, REPAIR OF RIGHT FCU AND ECU TENDONS, ULNAR VEIN LIGATION, RIGHT WRIST ARTHROTOMY AND DEBRIDEMENT performed by Loree Kussmaul, DO at [place] [address]       Discharge Medication List as of 9/10/2021  4:01 PM      CONTINUE these medications which have NOT CHANGED    Details   gabapentin (NEURONTIN) 300 MG capsule Take 1 capsule by mouth 3 times daily for 7 days. , Disp-21 capsule, R-0Normal      acetaminophen (TYLENOL) 325 MG tablet Take 2 tablets by mouth every 6 hours as needed for Pain, Disp-60 tablet, R-0Print      ibuprofen (ADVIL;MOTRIN) 200 MG tablet Take 2 tablets by mouth every 6 hours as needed for Pain or Fever, Disp-60 tablet, R-0Print      albuterol sulfate HFA (PROVENTIL HFA) 108 (90 Base) MCG/ACT inhaler Inhale 1-2 puffs into the lungs every 4 hours as needed for Wheezing or Shortness of Breath (Space out to every 6 hours as symptoms improve) Space out to every 6 hours as symptoms improve., Disp-1 Inhaler, R-0Print      ondansetron (ZOFRAN ODT) 4 MG disintegrating tablet Take 1 tablet by mouth every 8 hours as needed for Nausea, Disp-5 tablet, R-0Print      montelukast (SINGULAIR) 10 MG tablet Take 1 tablet by mouth nightly, Disp-30 tablet, R-0           ALLERGIES     has No Known Allergies. FAMILY HISTORY     has no family status information on file. SOCIAL HISTORY       Social History     Tobacco Use    Smoking status: Passive Smoke Exposure - Never Smoker    Smokeless tobacco: Never Used   Vaping Use    Vaping Use: Never used   Substance Use Topics    Alcohol use: No    Drug use: Yes     Types: Marijuana     PHYSICAL EXAM     INITIAL VITALS: /79   Pulse 79   Temp 98.6 °F (37 °C) (Oral)   Resp 16   Ht 5' 10\" (1.778 m)   Wt 180 lb 4.8 oz (81.8 kg)   SpO2 97%   BMI 25.87 kg/m²    Physical Exam  Vitals and nursing note reviewed.    Constitutional:       General: He is not

## 2021-09-10 NOTE — ED NOTES
Discharge instructions and follow up care reviewed with patient and all questions answered at this time. Patient stable and ambulatory at time of discharge.       Usman Montenegro RN  09/10/21 0716

## 2022-06-12 ENCOUNTER — HOSPITAL ENCOUNTER (EMERGENCY)
Age: 22
Discharge: HOME | End: 2022-06-12

## 2022-06-12 VITALS — OXYGEN SATURATION: 98 % | HEART RATE: 98 BPM

## 2022-06-17 ENCOUNTER — HOSPITAL ENCOUNTER (EMERGENCY)
Age: 22
Discharge: HOME | End: 2022-06-17
Attending: EMERGENCY MEDICINE
Payer: COMMERCIAL

## 2022-06-17 VITALS
TEMPERATURE: 98.1 F | DIASTOLIC BLOOD PRESSURE: 87 MMHG | HEART RATE: 98 BPM | SYSTOLIC BLOOD PRESSURE: 126 MMHG | OXYGEN SATURATION: 97 % | RESPIRATION RATE: 16 BRPM

## 2022-06-17 DIAGNOSIS — R09.81 NASAL CONGESTION: Primary | ICD-10-CM

## 2022-06-17 PROCEDURE — 99283 EMERGENCY DEPT VISIT LOW MDM: CPT

## 2022-06-17 RX ORDER — ALBUTEROL SULFATE 90 UG/1
1-2 AEROSOL, METERED RESPIRATORY (INHALATION) EVERY 4 HOURS PRN
Qty: 1 EACH | Refills: 1 | Status: SHIPPED | OUTPATIENT
Start: 2022-06-17 | End: 2022-09-01

## 2022-06-17 RX ORDER — FLUTICASONE PROPIONATE 50 MCG
2 SPRAY, SUSPENSION (ML) NASAL DAILY
Qty: 16 G | Refills: 0 | Status: SHIPPED | OUTPATIENT
Start: 2022-06-17

## 2022-06-17 ASSESSMENT — ENCOUNTER SYMPTOMS
RHINORRHEA: 1
EYE DISCHARGE: 1
SHORTNESS OF BREATH: 1
ABDOMINAL PAIN: 0
COLOR CHANGE: 0

## 2022-06-17 NOTE — ED PROVIDER NOTES
[facility]  ED  Emergency Department Encounter  Emergency Medicine Resident     Pt Name: Lorna Calderon MRN: [id number]  Birthdate 2000  Date of evaluation: 6/17/22  PCP:  Marley Chin MD    CHIEF COMPLAINT       Chief Complaint   Patient presents with    Nasal Congestion    Cough    Shortness of Breath       HISTORY OFPRESENT ILLNESS  (Location/Symptom, Timing/Onset, Context/Setting, Quality, Duration, Modifying Elmon .)      Lorna Calderon is a 24 y.o. male with past medical history of ADHD, asthma who presents with complaints of shortness of breath, nasal congestion. Patient states symptoms have been ongoing for the previous 1 week. He states he is out of his albuterol inhaler however his son also has asthma and he has tried using one of his sons breathing treatment however with no alleviation in his symptoms. He has not taken any medication for his symptoms. Patient states he does feel like \"his allergies are acting up \". No fever or chills. No chest pain, abdominal pain or nausea or vomiting. Patient also admits to a sensation of eyes watering bilaterally. Patient did not receive his COVID-19 vaccination, no household sick contacts. PAST MEDICAL / SURGICAL / SOCIAL / FAMILY HISTORY      has a past medical history of ADHD (attention deficit hyperactivity disorder) and Asthma. has a past surgical history that includes Foot Debridement (Left, 06/07/2020); Foot Debridement (Left, 6/7/2020); Wound debridement (Right, 08/19/2021); and Forearm surgery (Right, 8/19/2021).     Social History     Socioeconomic History    Marital status: Single     Spouse name: Not on file    Number of children: Not on file    Years of education: Not on file    Highest education level: Not on file   Occupational History    Not on file   Tobacco Use    Smoking status: Passive Smoke Exposure - Never Smoker    Smokeless tobacco: Never Used   Vaping Use    Vaping Use: Never used   Substance and Sexual Activity    Alcohol use: No    Drug use: Yes     Types: Marijuana Katelyn Kales)    Sexual activity: Not on file   Other Topics Concern    Not on file   Social History Narrative    ** Merged History Encounter **          Social Determinants of Health     Financial Resource Strain:     Difficulty of Paying Living Expenses: Not on file   Food Insecurity:     Worried About Running Out of Food in the Last Year: Not on file    Carlie of Food in the Last Year: Not on file   Transportation Needs:     Lack of Transportation (Medical): Not on file    Lack of Transportation (Non-Medical): Not on file   Physical Activity:     Days of Exercise per Week: Not on file    Minutes of Exercise per Session: Not on file   Stress:     Feeling of Stress : Not on file   Social Connections:     Frequency of Communication with Friends and Family: Not on file    Frequency of Social Gatherings with Friends and Family: Not on file    Attends Sikhism Services: Not on file    Active Member of [address] [place] [address] or Organizations: Not on file    Attends Club or Organization Meetings: Not on file    Marital Status: Not on file   Intimate Partner Violence:     Fear of Current or Ex-Partner: Not on file    Emotionally Abused: Not on file    Physically Abused: Not on file    Sexually Abused: Not on file   Housing Stability:     Unable to Pay for Housing in the Last Year: Not on file    Number of [place] in the Last Year: Not on file    Unstable Housing in the Last Year: Not on file       No family history on file. Allergies:  Patient has no known allergies. Home Medications:  Prior to Admission medications    Medication Sig Start Date End Date Taking?  Authorizing Provider   fluticasone (FLONASE) 50 MCG/ACT nasal spray 2 sprays by Each Nostril route daily 6/17/22  Yes Marcial Gaming, DO   loratadine-pseudoephedrine (CLARITIN-D 12HR) 5-120 MG per extended release tablet Take 1 tablet by mouth 2 times daily for 10 days 6/17/22 6/27/22 Yes Jewell Jackson DO   albuterol sulfate HFA (PROVENTIL HFA) 108 (90 Base) MCG/ACT inhaler Inhale 1-2 puffs into the lungs every 4 hours as needed for Wheezing or Shortness of Breath (Space out to every 6 hours as symptoms improve) Space out to every 6 hours as symptoms improve. 6/17/22 6/27/22 Yes Jewell Jackson DO   acetaminophen (TYLENOL) 500 MG tablet Take 2 tablets by mouth every 8 hours for 7 days 8/20/21 8/27/21  Brandie Dean, APRN - CNP   ibuprofen (ADVIL;MOTRIN) 400 MG tablet Take 1 tablet by mouth every 6 hours for 4 days 8/20/21 8/24/21  [place] Dean, APRN - CNP   gabapentin (NEURONTIN) 300 MG capsule Take 1 capsule by mouth 3 times daily for 7 days. 8/20/21 8/27/21  [place] Dianne APRN - CNP   acetaminophen (TYLENOL) 325 MG tablet Take 2 tablets by mouth every 6 hours as needed for Pain 6/19/21   Matias Ford MD   ibuprofen (ADVIL;MOTRIN) 200 MG tablet Take 2 tablets by mouth every 6 hours as needed for Pain or Fever 6/19/21   Matias Ford MD   ondansetron (ZOFRAN ODT) 4 MG disintegrating tablet Take 1 tablet by mouth every 8 hours as needed for Nausea 3/21/17   Yamila Mariscal MD   montelukast (SINGULAIR) 10 MG tablet Take 1 tablet by mouth nightly 11/8/16   Neris Espinoza MD       REVIEW OF SYSTEMS    (2-9 systems for level 4, 10 or more for level 5)      Review of Systems   Constitutional: Negative for fever. HENT: Positive for congestion and rhinorrhea. Eyes: Positive for discharge. Negative for visual disturbance. Respiratory: Positive for shortness of breath. Cardiovascular: Negative for chest pain. Gastrointestinal: Negative for abdominal pain. Musculoskeletal: Negative for myalgias. Skin: Negative for color change and rash. Neurological: Negative for headaches. PHYSICAL EXAM   (up to 7 for level 4, 8 or more for level 5)     INITIAL VITALS:    oral temperature is 98.1 °F (36.7 °C).  His blood pressure is 126/87 illness, allergic rhinitis    Initial MDM/Plan: 24 y.o. male who presents with 1 week of nasal congestion, clear eye discharge and shortness of breath. History of asthma however has been without albuterol for some time. Seen and examined, no acute distress, vitals are unremarkable, patient is well-appearing, speaking in full sentences, even nonlabored respirations. Lungs are clear to auscultation bilaterally patient is having normal oxygen saturations no indication for chest x-ray or breathing treatments at this time. Toya Nini is erythematous bilaterally with clear drainage, there is clear drainage noted from the eyes bilaterally however no injected sclera. Posterior oropharynx is erythematous with cobblestoning but no tonsillar exudate. Overall clinical picture is consistent with exacerbation of allergies versus possible viral illness, he was offered a COVID-19 test however declined. Will discharge with refill of albuterol, decongestion and Flonase. DIAGNOSTIC RESULTS / EMERGENCY DEPARTMENT COURSE / MDM     LABS:  Labs Reviewed - No data to display      RADIOLOGY:  No results found. EMERGENCY DEPARTMENT COURSE:     Given prescription for Flonase, albuterol, Claritin. Discharged home. PROCEDURES:  None    CONSULTS:  None    CRITICAL CARE:  Please see attending note    FINAL IMPRESSION      1.  Nasal congestion          DISPOSITION / PLAN     DISPOSITION Decision To Discharge 06/17/2022 01:25:28 PM        PATIENTREFERRED TO:  Abdiel Kurtz MD  [address] [phone]    Schedule an appointment as soon as possible for a visit   As needed      DISCHARGE MEDICATIONS:  New Prescriptions    FLUTICASONE (FLONASE) 50 MCG/ACT NASAL SPRAY    2 sprays by Each Nostril route daily    LORATADINE-PSEUDOEPHEDRINE (CLARITIN-D 12HR) 5-120 MG PER EXTENDED RELEASE TABLET    Take 1 tablet by mouth 2 times daily for 10 days       Tree Horner DO  EmergencyMedicine Resident    (Please note that portions of this note were completed with a voice recognition program.  Efforts were made to edit the dictations but occasionally words are mis-transcribed.)          Taylor Dueñas DO  Resident  06/17/22 [id number]

## 2022-06-17 NOTE — ED PROVIDER NOTES
[facility] ED     Emergency Department     Faculty Attestation    I performed a history and physical examination of the patient and discussed management with the resident. I reviewed the residents note and agree with the documented findings and plan of care. Any areas of disagreement are noted on the chart. I was personally present for the key portions of any procedures. I have documented in the chart those procedures where I was not present during the key portions. I have reviewed the emergency nurses triage note. I agree with the chief complaint, past medical history, past surgical history, allergies, medications, social and family history as documented unless otherwise noted below. For Physician Assistant/ Nurse Practitioner cases/documentation I have personally evaluated this patient and have completed at least one if not all key elements of the E/M (history, physical exam, and MDM). Additional findings are as noted. Runny nose cough sore throat history of allergies has been on medication the past but not currently. No fevers no sick contacts does not feel sick just congestion. Bilateral swollen boggy turbinates with clear discharge conjunctival injection with allergic shiners. No respiratory distress lungs clear.   Will discharge home with nasal steroids and antihistamine      Critical Care     none    Dc Man MD, [place]  Attending Emergency  Physician             Dc Man MD  06/17/22 [id number]

## 2022-09-01 ENCOUNTER — HOSPITAL ENCOUNTER (EMERGENCY)
Age: 22
Discharge: HOME | End: 2022-09-01
Attending: EMERGENCY MEDICINE
Payer: COMMERCIAL

## 2022-09-01 VITALS
TEMPERATURE: 97.3 F | SYSTOLIC BLOOD PRESSURE: 134 MMHG | OXYGEN SATURATION: 97 % | DIASTOLIC BLOOD PRESSURE: 91 MMHG | RESPIRATION RATE: 16 BRPM | WEIGHT: 205 LBS | HEART RATE: 61 BPM | BODY MASS INDEX: 29.35 KG/M2 | HEIGHT: 70 IN

## 2022-09-01 DIAGNOSIS — J45.909 ASTHMA, UNSPECIFIED ASTHMA SEVERITY, UNSPECIFIED WHETHER COMPLICATED, UNSPECIFIED WHETHER PERSISTENT: Primary | ICD-10-CM

## 2022-09-01 LAB
SARS-COV-2, RAPID: NOT DETECTED
SPECIMEN DESCRIPTION: NORMAL

## 2022-09-01 PROCEDURE — 99283 EMERGENCY DEPT VISIT LOW MDM: CPT

## 2022-09-01 PROCEDURE — 87635 SARS-COV-2 COVID-19 AMP PRB: CPT

## 2022-09-01 RX ORDER — ALBUTEROL SULFATE 90 UG/1
2 AEROSOL, METERED RESPIRATORY (INHALATION) 4 TIMES DAILY PRN
Qty: 54 G | Refills: 1 | Status: SHIPPED | OUTPATIENT
Start: 2022-09-01 | End: 2022-09-15

## 2022-09-01 ASSESSMENT — ENCOUNTER SYMPTOMS
VOMITING: 0
WHEEZING: 1
RHINORRHEA: 1
NAUSEA: 0
COUGH: 0
BACK PAIN: 0

## 2022-09-01 NOTE — ED PROVIDER NOTES
[facility] ED  Emergency Department Encounter  Emergency Medicine Resident     Pt Morgan Hogan. MRN: [id number]  Birthdate 2000  Date of evaluation: 9/1/22  PCP:  Aureliano Chin MD      CHIEF COMPLAINT       Chief Complaint   Patient presents with    Asthma    Shortness of Breath     Pulse ox 96% RA       HISTORY OF PRESENT ILLNESS  (Location/Symptom, Timing/Onset, Context/Setting, Quality, Duration, Modifying Factors, Severity.)      Cruz Mcgarry is a 24 y.o. male who presents with plaints of shortness of breath described as wheezing in the evenings over the last 2 to 3 days that last a couple hours at a time. Notes he ran out of his inhaler 2 weeks ago. No fever does note some rhinorrhea. No sore throat, nausea, vomiting, chest pain. PAST MEDICAL / SURGICAL / SOCIAL / FAMILY HISTORY      has a past medical history of ADHD (attention deficit hyperactivity disorder) and Asthma. Reviewed with patient     has a past surgical history that includes Foot Debridement (Left, 06/07/2020); Foot Debridement (Left, 6/7/2020); Wound debridement (Right, 08/19/2021); and Forearm surgery (Right, 8/19/2021).   Reviewed with patient    Social History     Socioeconomic History    Marital status: Single     Spouse name: Not on file    Number of children: Not on file    Years of education: Not on file    Highest education level: Not on file   Occupational History    Not on file   Tobacco Use    Smoking status: Passive Smoke Exposure - Never Smoker    Smokeless tobacco: Never   Vaping Use    Vaping Use: Never used   Substance and Sexual Activity    Alcohol use: No    Drug use: Yes     Types: Marijuana Candiss Littler)    Sexual activity: Not on file   Other Topics Concern    Not on file   Social History Narrative    ** Merged History Encounter **          Social Determinants of Health     Financial Resource Strain: Not on file   Food Insecurity: Not on file   Transportation Needs: Not on file Physical Activity: Not on file   Stress: Not on file   Social Connections: Not on file   Intimate Partner Violence: Not on file   Housing Stability: Not on file       No family history on file. Allergies:  Patient has no known allergies. Home Medications:  Prior to Admission medications    Medication Sig Start Date End Date Taking? Authorizing Provider   albuterol sulfate HFA (VENTOLIN HFA) 108 (90 Base) MCG/ACT inhaler Inhale 2 puffs into the lungs 4 times daily as needed for Wheezing 9/1/22 9/15/22 Yes Rome Lorenzo MD   fluticasone [facility]) 50 MCG/ACT nasal spray 2 sprays by Each Nostril route daily 6/17/22   Sandy Isaac DO   acetaminophen (TYLENOL) 500 MG tablet Take 2 tablets by mouth every 8 hours for 7 days 8/20/21 8/27/21  ABHIJEET Hung CNP   ibuprofen (ADVIL;MOTRIN) 400 MG tablet Take 1 tablet by mouth every 6 hours for 4 days 8/20/21 8/24/21  ABHIJEET Hung CNP   gabapentin (NEURONTIN) 300 MG capsule Take 1 capsule by mouth 3 times daily for 7 days. 8/20/21 8/27/21  ABHIJEET Hung CNP   acetaminophen (TYLENOL) 325 MG tablet Take 2 tablets by mouth every 6 hours as needed for Pain 6/19/21   Lydia Borjas MD   ibuprofen (ADVIL;MOTRIN) 200 MG tablet Take 2 tablets by mouth every 6 hours as needed for Pain or Fever 6/19/21   Lydia Borjas MD   ondansetron (ZOFRAN ODT) 4 MG disintegrating tablet Take 1 tablet by mouth every 8 hours as needed for Nausea 3/21/17   Leonora Ramos MD   montelukast (SINGULAIR) 10 MG tablet Take 1 tablet by mouth nightly 11/8/16   Aliyah Luu MD       REVIEW OF SYSTEMS    (2-9 systems for level 4, 10 or more for level 5)      Review of Systems   Constitutional:  Negative for chills and fever. HENT:  Positive for rhinorrhea. Negative for congestion. Respiratory:  Positive for wheezing. Negative for cough. Gastrointestinal:  Negative for nausea and vomiting.    Musculoskeletal:  Negative for back pain and neck pain.   Skin:  Negative for rash and wound. Neurological:  Negative for dizziness and headaches. PHYSICAL EXAM   (up to 7 for level 4, 8 or more for level 5)      INITIAL VITALS:   BP (!) 134/91   Pulse 61   Temp 97.3 °F (36.3 °C) (Oral)   Resp 16   Ht 5' 10\" (1.778 m)   Wt 205 lb (93 kg)   SpO2 97%   BMI 29.41 kg/m²     Physical Exam  Vitals and nursing note reviewed. Constitutional:       General: He is not in acute distress. HENT:      Head: Atraumatic. Right Ear: External ear normal.      Left Ear: External ear normal.      Mouth/Throat:      Mouth: Mucous membranes are moist.      Pharynx: Oropharynx is clear. No pharyngeal swelling or oropharyngeal exudate. Cardiovascular:      Rate and Rhythm: Normal rate and regular rhythm. Pulmonary:      Effort: Pulmonary effort is normal. No respiratory distress. Breath sounds: Normal breath sounds. No stridor. No wheezing or rales. Musculoskeletal:      Cervical back: Normal range of motion. Skin:     General: Skin is warm and dry. Capillary Refill: Capillary refill takes less than 2 seconds. Neurological:      General: No focal deficit present. Mental Status: He is alert and oriented to person, place, and time. DIFFERENTIAL  DIAGNOSIS     PLAN (LABS / IMAGING / EKG):  Orders Placed This Encounter   Procedures    COVID-19, Rapid       MEDICATIONS ORDERED:  Orders Placed This Encounter   Medications    albuterol sulfate HFA (VENTOLIN HFA) 108 (90 Base) MCG/ACT inhaler     Sig: Inhale 2 puffs into the lungs 4 times daily as needed for Wheezing     Dispense:  54 g     Refill:  1       DDX: Asthma exacerbation, viral URI, COVID    DIAGNOSTIC RESULTS / EMERGENCY DEPARTMENT COURSE / MDM   LAB RESULTS:  No results found for this visit on 09/01/22.     IMPRESSION: Asthma exacerbation    RADIOLOGY:  No orders to display     EMERGENCY DEPARTMENT COURSE:  77-year-old male, history of asthma, presented to ED with complaints of shortness of breath described as wheezing over the past 2 to 3 days and notes he ran out of his inhaler 2 weeks ago. Denies any current wheezing but notes he gets it at night for approximately 2 hours prior to resolving. Does note some rhinorrhea as well. No other URI symptoms. On exam, afebrile, lungs clear. Patient request COVID test.  Plan to refill inhaler prescription and recommend follow-up with PCP. ED Course as of 09/02/22 0715   Thu Sep 01, 2022   1048 SARS-CoV-2, Rapid: Not Detected [AR]      ED Course User Index  [AR] Viridiana Coleman MD       No notes of [facility] Admission Criteria type on file. PROCEDURES:  None    CONSULTS:  None    CRITICAL CARE:  None    ED Course as of 09/02/22 0715   Thu Sep 01, 2022   1048 SARS-CoV-2, Rapid: Not Detected [AR]      ED Course User Index  [AR] Viridiana Coleman MD       FINAL IMPRESSION      1.  Asthma, unspecified asthma severity, unspecified whether complicated, unspecified whether persistent          DISPOSITION / PLAN     DISPOSITION        PATIENT REFERRED TO:  Brittany Carrasquillo MD  [address] [address]  [phone]    In 3 days      [facility] ED  [facility]  [phone]    As needed      DISCHARGE MEDICATIONS:  New Prescriptions    ALBUTEROL SULFATE HFA (VENTOLIN HFA) 108 (90 BASE) MCG/ACT INHALER    Inhale 2 puffs into the lungs 4 times daily as needed for Wheezing       Silver Gay MD  Emergency Medicine Resident    (Please note that portions of thisnote were completed with a voice recognition program.  Efforts were made to edit the dictations but occasionally words are mis-transcribed.)      Viridiana Coleman MD  Resident  09/02/22 7372

## 2022-09-01 NOTE — ED TRIAGE NOTES
Patient presented to the ED today with complaints of SOB and asthma flare up. Patient stated that he has been out of his inhaler for 2 weeks.

## 2022-09-01 NOTE — ED PROVIDER NOTES
[facility] ED     Emergency Department     Faculty Attestation        I performed a history and physical examination of the patient and discussed management with the resident. I reviewed the residents note and agree with the documented findings and plan of care. Any areas of disagreement are noted on the chart. I was personally present for the key portions of any procedures. I have documented in the chart those procedures where I was not present during the key portions. I have reviewed the emergency nurses triage note. I agree with the chief complaint, past medical history, past surgical history, allergies, medications, social and family history as documented unless otherwise noted below. For Physician Assistant/ Nurse Practitioner cases/documentation I have personally evaluated this patient and have completed at least one if not all key elements of the E/M (history, physical exam, and MDM). Additional findings are as noted. Vital Signs: BP: (!) 134/91  Heart Rate: 61  Resp: 16  Temp: 97.3 °F (36.3 °C) SpO2: 97 %  PCP:  Lila Avila MD    Pertinent Comments:     Patient is a 15-year-old male with history of asthma and intermittent exacerbation ever since being out of his medications for the last several weeks. Patient also with recent mild runny nose but no increased shortness of breath or wheezing at this time. Currently lungs clear to station bilateral with a midline trachea and heart regular rate and rhythm. Abdomen is soft/nontender. HEENT examination with mild nasal rhinorrhea. Assessment/plan: URI with intermittent exacerbation and out of his baseline medications. Will provide rapid COVID swab but requested patient given symptomatic as well as refill of asthmatic medications.       Critical Care  None      (Please note that portions of this note were completed with a voice recognition program. Efforts were made to edit the dictations but occasionally words are mis-transcribed.  Whenever words are used in this note in any gender, they shall be construed as though they were used in the gender appropriate to the circumstances; and whenever words are used in this note in the singular or plural form, they shall be construed as though they were used in the form appropriate to the circumstances.)    Kelsey Diaz MD Xuan Pain  Attending Emergency Medicine Physician           Rupali Chang MD  09/01/22 500 Juventino Read MD  09/01/22 1028

## 2025-01-25 ENCOUNTER — APPOINTMENT (OUTPATIENT)
Dept: CT IMAGING | Age: 25
End: 2025-01-25

## 2025-01-25 ENCOUNTER — HOSPITAL ENCOUNTER (EMERGENCY)
Age: 25
Discharge: LEFT BEFORE DISCHARGE | End: 2025-01-25
Attending: EMERGENCY MEDICINE

## 2025-01-25 VITALS
RESPIRATION RATE: 19 BRPM | SYSTOLIC BLOOD PRESSURE: 103 MMHG | BODY MASS INDEX: 21.72 KG/M2 | WEIGHT: 143.3 LBS | TEMPERATURE: 98.7 F | SYSTOLIC BLOOD PRESSURE: 103 MMHG | HEART RATE: 86 BPM | OXYGEN SATURATION: 96 % | WEIGHT: 143.3 LBS | TEMPERATURE: 98.7 F | DIASTOLIC BLOOD PRESSURE: 66 MMHG | HEART RATE: 86 BPM | RESPIRATION RATE: 19 BRPM | DIASTOLIC BLOOD PRESSURE: 66 MMHG | HEIGHT: 68 IN | OXYGEN SATURATION: 96 % | BODY MASS INDEX: 21.72 KG/M2 | HEIGHT: 68 IN

## 2025-01-25 DIAGNOSIS — R56.9 SEIZURE-LIKE ACTIVITY (HCC): ICD-10-CM
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
## 2025-01-25 LAB
ABO + RH BLD: NORMAL
ABO + RH BLD: NORMAL
ANION GAP SERPL CALCULATED.3IONS-SCNC: 16 MMOL/L (ref 9–16)
ANION GAP SERPL CALCULATED.3IONS-SCNC: 16 MMOL/L (ref 9–16)
ARM BAND NUMBER: NORMAL
ARM BAND NUMBER: NORMAL
BLOOD BANK SAMPLE EXPIRATION: NORMAL
BLOOD BANK SAMPLE EXPIRATION: NORMAL
BLOOD BANK SPECIMEN: ABNORMAL
BLOOD BANK SPECIMEN: ABNORMAL
BLOOD GROUP ANTIBODIES SERPL: NEGATIVE
BLOOD GROUP ANTIBODIES SERPL: NEGATIVE
BODY TEMPERATURE: 37
BODY TEMPERATURE: 37
BUN SERPL-MCNC: 4 MG/DL (ref 6–20)
BUN SERPL-MCNC: 4 MG/DL (ref 6–20)
CHLORIDE SERPL-SCNC: 103 MMOL/L (ref 98–107)
CHLORIDE SERPL-SCNC: 103 MMOL/L (ref 98–107)
CO2 SERPL-SCNC: 21 MMOL/L (ref 20–31)
CO2 SERPL-SCNC: 21 MMOL/L (ref 20–31)
COHGB MFR BLD: 2.4 % (ref 0–5)
COHGB MFR BLD: 2.4 % (ref 0–5)
CREAT SERPL-MCNC: 0.9 MG/DL (ref 0.7–1.2)
CREAT SERPL-MCNC: 0.9 MG/DL (ref 0.7–1.2)
ERYTHROCYTE [DISTWIDTH] IN BLOOD BY AUTOMATED COUNT: 12.5 % (ref 11.8–14.4)
ERYTHROCYTE [DISTWIDTH] IN BLOOD BY AUTOMATED COUNT: 12.5 % (ref 11.8–14.4)
ETHANOL PERCENT: 0.23 %
ETHANOL PERCENT: 0.23 %
ETHANOLAMINE SERPL-MCNC: 234 MG/DL (ref 0–0.08)
ETHANOLAMINE SERPL-MCNC: 234 MG/DL (ref 0–0.08)
FIO2 ON VENT: ABNORMAL %
FIO2 ON VENT: ABNORMAL %
GFR, ESTIMATED: 58 ML/MIN/1.73M2
GFR, ESTIMATED: 58 ML/MIN/1.73M2
GLUCOSE SERPL-MCNC: 108 MG/DL (ref 74–99)
GLUCOSE SERPL-MCNC: 108 MG/DL (ref 74–99)
HCO3 VENOUS: 22.2 MMOL/L (ref 24–30)
HCO3 VENOUS: 22.2 MMOL/L (ref 24–30)
HCT VFR BLD AUTO: 46 % (ref 40.7–50.3)
HCT VFR BLD AUTO: 46 % (ref 40.7–50.3)
HGB BLD-MCNC: 15.3 G/DL (ref 13–17)
HGB BLD-MCNC: 15.3 G/DL (ref 13–17)
INR PPP: 1
INR PPP: 1
MCH RBC QN AUTO: 28 PG (ref 25.2–33.5)
MCH RBC QN AUTO: 28 PG (ref 25.2–33.5)
MCHC RBC AUTO-ENTMCNC: 33.3 G/DL (ref 28.4–34.8)
MCHC RBC AUTO-ENTMCNC: 33.3 G/DL (ref 28.4–34.8)
MCV RBC AUTO: 84.2 FL (ref 82.6–102.9)
MCV RBC AUTO: 84.2 FL (ref 82.6–102.9)
NEGATIVE BASE EXCESS, VEN: 2 MMOL/L (ref 0–2)
NEGATIVE BASE EXCESS, VEN: 2 MMOL/L (ref 0–2)
NRBC BLD-RTO: 0 PER 100 WBC
NRBC BLD-RTO: 0 PER 100 WBC
O2 SAT, VEN: 97.1 % (ref 60–85)
O2 SAT, VEN: 97.1 % (ref 60–85)
PARTIAL THROMBOPLASTIN TIME: 24.9 SEC (ref 23–36.5)
PARTIAL THROMBOPLASTIN TIME: 24.9 SEC (ref 23–36.5)
PCO2 VENOUS: 36.6 MM HG (ref 39–55)
PCO2 VENOUS: 36.6 MM HG (ref 39–55)
PH VENOUS: 7.4 (ref 7.32–7.42)
PH VENOUS: 7.4 (ref 7.32–7.42)
PLATELET # BLD AUTO: 195 K/UL (ref 138–453)
PLATELET # BLD AUTO: 195 K/UL (ref 138–453)
PMV BLD AUTO: 9.2 FL (ref 8.1–13.5)
PMV BLD AUTO: 9.2 FL (ref 8.1–13.5)
PO2 VENOUS: 91.9 MM HG (ref 30–50)
PO2 VENOUS: 91.9 MM HG (ref 30–50)
POTASSIUM SERPL-SCNC: 3.4 MMOL/L (ref 3.7–5.3)
POTASSIUM SERPL-SCNC: 3.4 MMOL/L (ref 3.7–5.3)
PROTHROMBIN TIME: 13.5 SEC (ref 11.7–14.9)
PROTHROMBIN TIME: 13.5 SEC (ref 11.7–14.9)
RBC # BLD AUTO: 5.46 M/UL (ref 4.21–5.77)
RBC # BLD AUTO: 5.46 M/UL (ref 4.21–5.77)
SODIUM SERPL-SCNC: 140 MMOL/L (ref 136–145)
SODIUM SERPL-SCNC: 140 MMOL/L (ref 136–145)
WBC OTHER # BLD: 5.1 K/UL (ref 3.5–11.3)
WBC OTHER # BLD: 5.1 K/UL (ref 3.5–11.3)

## 2025-01-25 PROCEDURE — 6360000004 HC RX CONTRAST MEDICATION

## 2025-01-25 PROCEDURE — 82565 ASSAY OF CREATININE: CPT

## 2025-01-25 PROCEDURE — 86900 BLOOD TYPING SEROLOGIC ABO: CPT

## 2025-01-25 PROCEDURE — 82947 ASSAY GLUCOSE BLOOD QUANT: CPT

## 2025-01-25 PROCEDURE — 85027 COMPLETE CBC AUTOMATED: CPT

## 2025-01-25 PROCEDURE — 99285 EMERGENCY DEPT VISIT HI MDM: CPT

## 2025-01-25 PROCEDURE — 86901 BLOOD TYPING SEROLOGIC RH(D): CPT

## 2025-01-25 PROCEDURE — 86850 RBC ANTIBODY SCREEN: CPT

## 2025-01-25 PROCEDURE — 85610 PROTHROMBIN TIME: CPT

## 2025-01-25 PROCEDURE — 71260 CT THORAX DX C+: CPT

## 2025-01-25 PROCEDURE — G0480 DRUG TEST DEF 1-7 CLASSES: HCPCS

## 2025-01-25 PROCEDURE — 70450 CT HEAD/BRAIN W/O DYE: CPT

## 2025-01-25 PROCEDURE — 84520 ASSAY OF UREA NITROGEN: CPT

## 2025-01-25 PROCEDURE — 6810039001 HC L1 TRAUMA PRIORITY

## 2025-01-25 PROCEDURE — 84703 CHORIONIC GONADOTROPIN ASSAY: CPT

## 2025-01-25 PROCEDURE — 80051 ELECTROLYTE PANEL: CPT

## 2025-01-25 PROCEDURE — 72125 CT NECK SPINE W/O DYE: CPT

## 2025-01-25 PROCEDURE — 82805 BLOOD GASES W/O2 SATURATION: CPT

## 2025-01-25 PROCEDURE — 85730 THROMBOPLASTIN TIME PARTIAL: CPT

## 2025-01-25 RX ORDER — IOPAMIDOL 755 MG/ML
130 INJECTION, SOLUTION INTRAVASCULAR
Status: COMPLETED | OUTPATIENT
Start: 2025-01-25 | End: 2025-01-25

## 2025-01-25 RX ADMIN — IOPAMIDOL 130 ML: 755 INJECTION, SOLUTION INTRAVENOUS at 05:10

## 2025-01-25 ASSESSMENT — ENCOUNTER SYMPTOMS
EYES NEGATIVE: 1
GASTROINTESTINAL NEGATIVE: 1
RESPIRATORY NEGATIVE: 1

## 2025-01-25 ASSESSMENT — LIFESTYLE VARIABLES
HOW MANY STANDARD DRINKS CONTAINING ALCOHOL DO YOU HAVE ON A TYPICAL DAY: PATIENT DECLINED
HOW OFTEN DO YOU HAVE A DRINK CONTAINING ALCOHOL: PATIENT DECLINED

## 2025-01-25 ASSESSMENT — PAIN SCALES - GENERAL: PAINLEVEL_OUTOF10: 8

## 2025-01-25 ASSESSMENT — PAIN - FUNCTIONAL ASSESSMENT: PAIN_FUNCTIONAL_ASSESSMENT: 0-10

## 2025-01-25 NOTE — ED NOTES
Patient turned to left side even after the writer explains that he needs to stay flat on bed because c-spine is not clear yet. Patient refused to listen and do it anyway.
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
## 2025-01-25 NOTE — ED NOTES
Patient removed his collar brace off of his neck and refusing to put it back. Writer explains the importance of putting it back for safety measure but patient refused to listen.
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
## 2025-01-25 NOTE — ED NOTES
LOV 10/10/19   LR 1/15/20   Patient walked out to [facility] and left with significant other. [place] [employer] notified. RN Staff from [facility] check outside and patient is gone with existing IV's. Dr. Waters notified due to Dr. Shah is currently in a different room attending different patient.

## 2025-06-14 ENCOUNTER — APPOINTMENT (OUTPATIENT)
Dept: GENERAL RADIOLOGY | Age: 25
End: 2025-06-14
Payer: COMMERCIAL

## 2025-06-14 ENCOUNTER — HOSPITAL ENCOUNTER (EMERGENCY)
Age: 25
Discharge: HOME | End: 2025-06-14
Attending: EMERGENCY MEDICINE
Payer: COMMERCIAL

## 2025-06-14 VITALS
TEMPERATURE: 98.1 F | OXYGEN SATURATION: 97 % | SYSTOLIC BLOOD PRESSURE: 139 MMHG | HEART RATE: 81 BPM | DIASTOLIC BLOOD PRESSURE: 85 MMHG | RESPIRATION RATE: 14 BRPM

## 2025-06-14 DIAGNOSIS — S62.234A CLOSED NONDISPLACED FRACTURE OF BASE OF FIRST METACARPAL BONE OF RIGHT HAND, UNSPECIFIED FRACTURE MORPHOLOGY, INITIAL ENCOUNTER: Primary | ICD-10-CM

## 2025-06-14 PROCEDURE — 73130 X-RAY EXAM OF HAND: CPT

## 2025-06-14 PROCEDURE — 73110 X-RAY EXAM OF WRIST: CPT

## 2025-06-14 PROCEDURE — 6370000000 HC RX 637 (ALT 250 FOR IP)

## 2025-06-14 PROCEDURE — 99283 EMERGENCY DEPT VISIT LOW MDM: CPT | Performed by: EMERGENCY MEDICINE

## 2025-06-14 PROCEDURE — 29130 APPL FINGER SPLINT STATIC: CPT | Performed by: EMERGENCY MEDICINE

## 2025-06-14 RX ORDER — IBUPROFEN 800 MG/1
800 TABLET, FILM COATED ORAL ONCE
Status: COMPLETED | OUTPATIENT
Start: 2025-06-14 | End: 2025-06-14

## 2025-06-14 RX ADMIN — IBUPROFEN 800 MG: 800 TABLET, FILM COATED ORAL at 21:07

## 2025-06-14 ASSESSMENT — PAIN - FUNCTIONAL ASSESSMENT
PAIN_FUNCTIONAL_ASSESSMENT: NONE - DENIES PAIN
PAIN_FUNCTIONAL_ASSESSMENT: NONE - DENIES PAIN

## 2025-06-15 NOTE — ED PROVIDER NOTES
[facility] EMERGENCY DEPARTMENT     Emergency Department     Faculty Attestation        I performed a history and physical examination of the patient and discussed management with the resident. I reviewed the resident’s note and agree with the documented findings and plan of care. Any areas of disagreement are noted on the chart. I was personally present for the key portions of any procedures. I have documented in the chart those procedures where I was not present during the key portions. I have reviewed the emergency nurses triage note. I agree with the chief complaint, past medical history, past surgical history, allergies, medications, social and family history as documented unless otherwise noted below.  For Physician Assistant/ Nurse Practitioner cases/documentation I have personally evaluated this patient and have completed at least one if not all key elements of the E/M (history, physical exam, and MDM). Additional findings are as noted.      Vital Signs: BP: 139/85  Pulse: 81  Respirations: 14  Temp: 98.1 °F (36.7 °C) SpO2: 97 %  PCP:  Kaelyn Dickinson MD  Note Started: 6/14/25, 8:46 PM EDT    Pertinent Comments:     Patient is a 24-year-old male who was at work and was lifting a heavy machine when it then dropped on his right thumb/hand.   No wrist injury or pain or other arm pain.   No pain on the 2nd through 5th digits but does have pain over the thumb as well as thenar eminence.    Neurovascularly intact however with tendon function preserved.   Assessment/Plan: Injury of the right hand thumb/metacarpal will obtain hand x-ray as well as attempt symptomatic control and reevaluate after      Critical Care  None      (Please note that portions of this note were completed with a voice recognition program. Efforts were made to edit the dictations but occasionally words are mis-transcribed. Whenever words are used in this note in any gender, they shall 
given the follow-up with hand surgery and primary care provider.  Was instructed on symptomatic management and discharge.    Amount and/or Complexity of Data Reviewed  Radiology: ordered. Decision-making details documented in ED Course.    Risk  Prescription drug management.        EKG      All EKG's are interpreted by the Emergency Department Physician who either signs or Co-signs this chart in the absence of a cardiologist.    EMERGENCY DEPARTMENT COURSE:      ED Course as of 06/15/25 2042   Sat Jun 14, 2025   2210 XR HAND RIGHT (MIN 3 VIEWS)  IMPRESSION:  1. Acute nondisplaced fracture involving the base of the 1st metacarpal,  involving the carpometacarpal articular surface.  2. Small well corticated osseous fragment within the dorsal aspect of the  wrist, which may represent an acute or remote triquetral fracture.  3. Old fracture deformities involving the ulnar styloid, and 5th metacarpal.   [MW]      ED Course User Index  [MW] Maximus Reid MD       PROCEDURES:      CONSULTS:  None    CRITICAL CARE:  There was significant risk of life threatening deterioration of patient's condition requiring my direct management. Critical care time  minutes, excluding any documented procedures.    FINAL IMPRESSION      1. Closed nondisplaced fracture of base of first metacarpal bone of right hand, unspecified fracture morphology, initial encounter          DISPOSITION / PLAN     DISPOSITION Decision To Discharge 06/14/2025 10:57:57 PM   DISPOSITION CONDITION Stable           PATIENT REFERRED TO:  Kaelyn Dickinson MD  [address] [facility] [address]  [phone]          [facility] Emergency Department  [facility] [facility] [address]  [phone]  Go to   If symptoms worsen    [facility]  [address] [facility] [address]  [phone]        [facility] OCCUPATIONAL HEALTH -- [facility] OCCUPATIONAL [address] [address]  [phone]          DISCHARGE

## 2025-06-15 NOTE — DISCHARGE INSTRUCTIONS
You have a fracture at the base of your thumb.  You will need to keep the splint on for the next 7 days.  You will need to follow-up with hand surgery have included the contact information in your discharge paperwork.  Please return to the emergency department if you have any increased pain or swelling in your finger, numbness tingling in the finger discoloration of the distal aspect of the finger or any other concerning symptoms.  You can use Tylenol and Motrin as needed for symptomatic relief.  He will need to follow-up with occupational health in this is included in your discharge paperwork as well.

## 2025-06-15 NOTE — ED TRIAGE NOTES
Patient presents to the ED ambulatory from Triage. Patient states that he smashed his right thumb in a device at work. Patient has decreased range of movement. Patient is in NAD, respirations are even and unlabored, bed in lowest position and call light with in reach. Writer will continue with plan of care

## (undated) DEVICE — INTENDED FOR TISSUE SEPARATION, AND OTHER PROCEDURES THAT REQUIRE A SHARP SURGICAL BLADE TO PUNCTURE OR CUT.: Brand: BARD-PARKER ® CARBON RIB-BACK BLADES

## (undated) DEVICE — GOWN,SIRUS,NONRNF,SETINSLV,XL,20/CS: Brand: MEDLINE

## (undated) DEVICE — DRAPE,U/ SHT,SPLIT,PLAS,STERIL: Brand: MEDLINE

## (undated) DEVICE — GOWN,AURORA,NONRNF,XL,30/CS: Brand: MEDLINE

## (undated) DEVICE — SOLUTION SCRB 4OZ 4% CHG H2O AIDED FOR PREOPERATIVE SKIN

## (undated) DEVICE — PADDING,UNDERCAST,COTTON, 4"X4YD STERILE: Brand: MEDLINE

## (undated) DEVICE — TUBING SUCT 12FR MAL ALUM SHFT FN CAP VENT UNIV CONN W/ OBT

## (undated) DEVICE — HEWSON SUTURE RETRIEVER: Brand: HEWSON SUTURE RETRIEVER

## (undated) DEVICE — C-ARM: Brand: UNBRANDED

## (undated) DEVICE — GOWN,SIRUS,NONRNF,SETINSLV,2XL,18/CS: Brand: MEDLINE

## (undated) DEVICE — DRESSING,GAUZE,XEROFORM,CURAD,1"X8",ST: Brand: CURAD

## (undated) DEVICE — GLOVE ORANGE PI 8   MSG9080

## (undated) DEVICE — BNDG,ELSTC,MATRIX,STRL,2"X5YD,LF,HOOK&LP: Brand: MEDLINE

## (undated) DEVICE — GLOVE ORTHO 8   MSG9480

## (undated) DEVICE — SUTURE VCRL SZ 2-0 L18IN ABSRB UD CT-1 L36MM 1/2 CIR J839D

## (undated) DEVICE — BANDAGE,GAUZE,BULKEE II,4.5"X4.1YD,STRL: Brand: MEDLINE

## (undated) DEVICE — BANDAGE,GAUZE,CONFORMING,3"X75",STRL,LF: Brand: MEDLINE

## (undated) DEVICE — GLOVE ORANGE PI 7   MSG9070

## (undated) DEVICE — BNDG,ELSTC,MATRIX,STRL,4"X5YD,LF,HOOK&LP: Brand: MEDLINE

## (undated) DEVICE — TOURNIQUET CUF BLD PRESSURE 4X18 IN 2 PRT SINGLE BLDR RED

## (undated) DEVICE — SUTURE VCRL SZ 0 L18IN ABSRB UD L36MM CT-1 1/2 CIR J840D

## (undated) DEVICE — SVMMC POD PK

## (undated) DEVICE — GAUZE,SPONGE,FLUFF,6"X6.75",STRL,5/TRAY: Brand: MEDLINE

## (undated) DEVICE — SVMMC ORTH SPL DRP PK

## (undated) DEVICE — CYSTO/BLADDER IRRIGATION SET, REGULATING CLAMP